# Patient Record
Sex: MALE | Race: BLACK OR AFRICAN AMERICAN | NOT HISPANIC OR LATINO | URBAN - METROPOLITAN AREA
[De-identification: names, ages, dates, MRNs, and addresses within clinical notes are randomized per-mention and may not be internally consistent; named-entity substitution may affect disease eponyms.]

---

## 2017-03-07 ENCOUNTER — EMERGENCY (EMERGENCY)
Facility: HOSPITAL | Age: 62
LOS: 1 days | Discharge: HOME | End: 2017-03-07
Attending: EMERGENCY MEDICINE | Admitting: EMERGENCY MEDICINE
Payer: MEDICAID

## 2017-03-07 VITALS
RESPIRATION RATE: 18 BRPM | DIASTOLIC BLOOD PRESSURE: 83 MMHG | TEMPERATURE: 98 F | SYSTOLIC BLOOD PRESSURE: 135 MMHG | HEART RATE: 71 BPM | OXYGEN SATURATION: 98 %

## 2017-03-07 DIAGNOSIS — M25.561 PAIN IN RIGHT KNEE: ICD-10-CM

## 2017-03-07 DIAGNOSIS — F10.129 ALCOHOL ABUSE WITH INTOXICATION, UNSPECIFIED: ICD-10-CM

## 2017-03-07 DIAGNOSIS — R41.82 ALTERED MENTAL STATUS, UNSPECIFIED: ICD-10-CM

## 2017-03-07 DIAGNOSIS — Z88.1 ALLERGY STATUS TO OTHER ANTIBIOTIC AGENTS STATUS: ICD-10-CM

## 2017-03-07 DIAGNOSIS — Z88.0 ALLERGY STATUS TO PENICILLIN: ICD-10-CM

## 2017-03-07 PROCEDURE — 99283 EMERGENCY DEPT VISIT LOW MDM: CPT

## 2017-03-07 NOTE — ED ADULT NURSE NOTE - CHIEF COMPLAINT QUOTE
Pt. presents to the ED picked up from [facility] for unsteady gait and right leg pain. Pt. reports having right leg surgery 3-4 weeks ago, external fixation device in place to leg. Pt. admits to drinking alcohol today and being homeless.

## 2017-03-07 NOTE — ED PROVIDER NOTE - MUSCULOSKELETAL, MLM
Spine appears normal, range of motion is not limited, no muscle or joint tenderness. External fixation to the right leg

## 2017-03-07 NOTE — ED PROVIDER NOTE - NS ED MD SCRIBE ATTENDING SCRIBE SECTIONS
REVIEW OF SYSTEMS/PHYSICAL EXAM/RESULTS/HIV/DISPOSITION/HISTORY OF PRESENT ILLNESS/VITAL SIGNS( Pullset)/PROGRESS NOTE/PAST MEDICAL/SURGICAL/SOCIAL HISTORY

## 2017-03-07 NOTE — ED PROVIDER NOTE - OBJECTIVE STATEMENT
61y.o male with a history of seizure and alcohol abuse presents to the ED for AMS. Pt brought into unsteady gait and right knee pain. Pt does not know where he received his right leg surgery. Has an external fixation in place.   Admits to drinking half a pint of vodka

## 2017-11-13 ENCOUNTER — EMERGENCY (EMERGENCY)
Facility: HOSPITAL | Age: 62
LOS: 1 days | Discharge: HOME | End: 2017-11-13
Attending: EMERGENCY MEDICINE
Payer: MEDICAID

## 2017-11-13 VITALS
TEMPERATURE: 98 F | OXYGEN SATURATION: 100 % | RESPIRATION RATE: 18 BRPM | DIASTOLIC BLOOD PRESSURE: 67 MMHG | SYSTOLIC BLOOD PRESSURE: 125 MMHG | HEART RATE: 50 BPM

## 2017-11-13 VITALS
OXYGEN SATURATION: 97 % | HEART RATE: 71 BPM | HEIGHT: 69 IN | DIASTOLIC BLOOD PRESSURE: 64 MMHG | WEIGHT: 160.06 LBS | TEMPERATURE: 98 F | SYSTOLIC BLOOD PRESSURE: 134 MMHG | RESPIRATION RATE: 16 BRPM

## 2017-11-13 DIAGNOSIS — R29.818 OTHER SYMPTOMS AND SIGNS INVOLVING THE NERVOUS SYSTEM: ICD-10-CM

## 2017-11-13 DIAGNOSIS — Z88.8 ALLERGY STATUS TO OTHER DRUGS, MEDICAMENTS AND BIOLOGICAL SUBSTANCES STATUS: ICD-10-CM

## 2017-11-13 DIAGNOSIS — Z88.0 ALLERGY STATUS TO PENICILLIN: ICD-10-CM

## 2017-11-13 LAB
ALBUMIN SERPL ELPH-MCNC: 3.4 G/DL — LOW (ref 3.5–5)
ALP SERPL-CCNC: 95 U/L — SIGNIFICANT CHANGE UP (ref 40–120)
ALT FLD-CCNC: 46 U/L DA — SIGNIFICANT CHANGE UP (ref 10–60)
ANION GAP SERPL CALC-SCNC: 6 MMOL/L — SIGNIFICANT CHANGE UP (ref 5–17)
AST SERPL-CCNC: 61 U/L — HIGH (ref 10–40)
BILIRUB SERPL-MCNC: 0.3 MG/DL — SIGNIFICANT CHANGE UP (ref 0.2–1.2)
BUN SERPL-MCNC: 20 MG/DL — HIGH (ref 7–18)
CALCIUM SERPL-MCNC: 8.9 MG/DL — SIGNIFICANT CHANGE UP (ref 8.4–10.5)
CHLORIDE SERPL-SCNC: 104 MMOL/L — SIGNIFICANT CHANGE UP (ref 96–108)
CO2 SERPL-SCNC: 30 MMOL/L — SIGNIFICANT CHANGE UP (ref 22–31)
CREAT SERPL-MCNC: 0.9 MG/DL — SIGNIFICANT CHANGE UP (ref 0.5–1.3)
GLUCOSE SERPL-MCNC: 92 MG/DL — SIGNIFICANT CHANGE UP (ref 70–99)
HCT VFR BLD CALC: 38.8 % — LOW (ref 39–50)
HGB BLD-MCNC: 12.6 G/DL — LOW (ref 13–17)
MCHC RBC-ENTMCNC: 32 PG — SIGNIFICANT CHANGE UP (ref 27–34)
MCHC RBC-ENTMCNC: 32.4 GM/DL — SIGNIFICANT CHANGE UP (ref 32–36)
MCV RBC AUTO: 98.7 FL — SIGNIFICANT CHANGE UP (ref 80–100)
PLATELET # BLD AUTO: 219 K/UL — SIGNIFICANT CHANGE UP (ref 150–400)
POTASSIUM SERPL-MCNC: 4.1 MMOL/L — SIGNIFICANT CHANGE UP (ref 3.5–5.3)
POTASSIUM SERPL-SCNC: 4.1 MMOL/L — SIGNIFICANT CHANGE UP (ref 3.5–5.3)
PROT SERPL-MCNC: 7.7 G/DL — SIGNIFICANT CHANGE UP (ref 6–8.3)
RBC # BLD: 3.93 M/UL — LOW (ref 4.2–5.8)
RBC # FLD: 13.2 % — SIGNIFICANT CHANGE UP (ref 10.3–14.5)
SODIUM SERPL-SCNC: 140 MMOL/L — SIGNIFICANT CHANGE UP (ref 135–145)
WBC # BLD: 5.1 K/UL — SIGNIFICANT CHANGE UP (ref 3.8–10.5)
WBC # FLD AUTO: 5.1 K/UL — SIGNIFICANT CHANGE UP (ref 3.8–10.5)

## 2017-11-13 PROCEDURE — 82962 GLUCOSE BLOOD TEST: CPT

## 2017-11-13 PROCEDURE — 70450 CT HEAD/BRAIN W/O DYE: CPT

## 2017-11-13 PROCEDURE — 99284 EMERGENCY DEPT VISIT MOD MDM: CPT | Mod: 25

## 2017-11-13 PROCEDURE — 80053 COMPREHEN METABOLIC PANEL: CPT

## 2017-11-13 PROCEDURE — 85027 COMPLETE CBC AUTOMATED: CPT

## 2017-11-13 PROCEDURE — 70450 CT HEAD/BRAIN W/O DYE: CPT | Mod: 26

## 2017-11-13 PROCEDURE — 96374 THER/PROPH/DIAG INJ IV PUSH: CPT

## 2017-11-13 PROCEDURE — 99284 EMERGENCY DEPT VISIT MOD MDM: CPT

## 2017-11-13 RX ORDER — LEVETIRACETAM 250 MG/1
1000 TABLET, FILM COATED ORAL EVERY 12 HOURS
Qty: 0 | Refills: 0 | Status: DISCONTINUED | OUTPATIENT
Start: 2017-11-13 | End: 2017-11-17

## 2017-11-13 RX ADMIN — Medication 50 MILLIGRAM(S): at 13:25

## 2017-11-13 RX ADMIN — LEVETIRACETAM 400 MILLIGRAM(S): 250 TABLET, FILM COATED ORAL at 13:24

## 2017-11-13 NOTE — ED PROVIDER NOTE - ENMT, MLM
Airway patent, Nasal mucosa clear. Mouth with normal mucosa. Throat has no vesicles, no oropharyngeal exudates and uvula is midline. No tongue fasciculations.

## 2017-11-13 NOTE — ED ADULT NURSE NOTE - CAS EDN DISCHARGE ASSESSMENT
Alert and oriented to person, place and time/Symptoms improved/No adverse reaction to first time med in ED/Awake

## 2017-11-13 NOTE — ED PROVIDER NOTE - OBJECTIVE STATEMENT
63 y/o M pt with PMHx of Seizure Disorder and Drug Abuse and no significant PSHx presents to ED c/o aura about possible seizure today. As per pt, pt "feels like he is going to have a seizure". Pt denies seizure, or any other complaints. Allergies: Penicillin (anaphylaxis), Dilantin (unknown).

## 2017-11-13 NOTE — ED PROVIDER NOTE - CONDUCTED A DETAILED DISCUSSION WITH PATIENT AND/OR GUARDIAN REGARDING, MDM
need for outpatient follow-up/lab results lab results/need for outpatient follow-up/radiology results/return to ED if symptoms worsen, persist or questions arise

## 2017-11-13 NOTE — ED ADULT NURSE NOTE - CHPI ED SYMPTOMS NEG
no numbness/no change in level of consciousness/no fever/no weakness/no dizziness/no vomiting/no nausea/no blurred vision/no loss of consciousness/no confusion

## 2017-11-13 NOTE — ED PROVIDER NOTE - MEDICAL DECISION MAKING DETAILS
pt came in for seizure aura, has not been taking his prescribed keppra  demanding to have klonopin and percocet for seizure. when discharged states he lost his bottle of klonopin in his ED bed. Bottle was empty in the nearby trash.   refusing to go home unless has prescription for klonopin. has pmd. aox3, ambulating, well appearing.   Discussed anticipatory guidance and return precautions. Discussed results and gave copy to pt.  Dc with outpt follow up.

## 2017-11-13 NOTE — ED PROVIDER NOTE - MUSCULOSKELETAL, MLM
Spine appears normal, range of motion is not limited, no muscle or joint tenderness. Essential hand tremors b/l.

## 2018-01-01 ENCOUNTER — EMERGENCY (EMERGENCY)
Facility: HOSPITAL | Age: 63
LOS: 0 days | Discharge: HOME | End: 2018-02-26
Attending: EMERGENCY MEDICINE
Payer: MEDICAID

## 2018-01-01 ENCOUNTER — EMERGENCY (EMERGENCY)
Facility: HOSPITAL | Age: 63
LOS: 1 days | Discharge: HOME | End: 2018-01-01
Admitting: EMERGENCY MEDICINE
Payer: MEDICAID

## 2018-01-01 VITALS
TEMPERATURE: 98 F | RESPIRATION RATE: 16 BRPM | DIASTOLIC BLOOD PRESSURE: 63 MMHG | OXYGEN SATURATION: 96 % | HEART RATE: 61 BPM | SYSTOLIC BLOOD PRESSURE: 102 MMHG

## 2018-01-01 VITALS
RESPIRATION RATE: 16 BRPM | OXYGEN SATURATION: 98 % | HEART RATE: 63 BPM | DIASTOLIC BLOOD PRESSURE: 68 MMHG | SYSTOLIC BLOOD PRESSURE: 114 MMHG | TEMPERATURE: 98 F

## 2018-01-01 VITALS
DIASTOLIC BLOOD PRESSURE: 60 MMHG | OXYGEN SATURATION: 98 % | SYSTOLIC BLOOD PRESSURE: 111 MMHG | TEMPERATURE: 98 F | HEART RATE: 66 BPM | RESPIRATION RATE: 17 BRPM

## 2018-01-01 VITALS
DIASTOLIC BLOOD PRESSURE: 39 MMHG | SYSTOLIC BLOOD PRESSURE: 108 MMHG | RESPIRATION RATE: 16 BRPM | HEART RATE: 42 BPM | OXYGEN SATURATION: 99 % | TEMPERATURE: 98 F

## 2018-01-01 DIAGNOSIS — G40.909 EPILEPSY, UNSPECIFIED, NOT INTRACTABLE, WITHOUT STATUS EPILEPTICUS: ICD-10-CM

## 2018-01-01 DIAGNOSIS — Z79.891 LONG TERM (CURRENT) USE OF OPIATE ANALGESIC: ICD-10-CM

## 2018-01-01 DIAGNOSIS — Z88.0 ALLERGY STATUS TO PENICILLIN: ICD-10-CM

## 2018-01-01 DIAGNOSIS — M54.9 DORSALGIA, UNSPECIFIED: ICD-10-CM

## 2018-01-01 DIAGNOSIS — R41.82 ALTERED MENTAL STATUS, UNSPECIFIED: ICD-10-CM

## 2018-01-01 DIAGNOSIS — Z79.1 LONG TERM (CURRENT) USE OF NON-STEROIDAL ANTI-INFLAMMATORIES (NSAID): ICD-10-CM

## 2018-01-01 DIAGNOSIS — F10.129 ALCOHOL ABUSE WITH INTOXICATION, UNSPECIFIED: ICD-10-CM

## 2018-01-01 DIAGNOSIS — F17.210 NICOTINE DEPENDENCE, CIGARETTES, UNCOMPLICATED: ICD-10-CM
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
## 2018-01-01 DIAGNOSIS — R10.9 UNSPECIFIED ABDOMINAL PAIN: ICD-10-CM
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
## 2018-01-01 DIAGNOSIS — Z88.8 ALLERGY STATUS TO OTHER DRUGS, MEDICAMENTS AND BIOLOGICAL SUBSTANCES: ICD-10-CM

## 2018-01-01 DIAGNOSIS — F11.29 OPIOID DEPENDENCE WITH UNSPECIFIED OPIOID-INDUCED DISORDER: ICD-10-CM

## 2018-01-01 DIAGNOSIS — Z79.899 OTHER LONG TERM (CURRENT) DRUG THERAPY: ICD-10-CM

## 2018-01-01 DIAGNOSIS — F10.10 ALCOHOL ABUSE, UNCOMPLICATED: ICD-10-CM

## 2018-01-01 DIAGNOSIS — S39.012A STRAIN OF MUSCLE, FASCIA AND TENDON OF LOWER BACK, INITIAL ENCOUNTER: ICD-10-CM

## 2018-01-01 LAB
ALBUMIN SERPL ELPH-MCNC: 3.4 G/DL — SIGNIFICANT CHANGE UP (ref 3.3–5)
ALP SERPL-CCNC: 81 U/L — SIGNIFICANT CHANGE UP (ref 40–120)
ALT FLD-CCNC: 56 U/L — SIGNIFICANT CHANGE UP (ref 12–78)
AMPHET UR-MCNC: NEGATIVE — SIGNIFICANT CHANGE UP
ANION GAP SERPL CALC-SCNC: 9 MMOL/L — SIGNIFICANT CHANGE UP (ref 5–17)
APAP SERPL-MCNC: < 2 UG/ML (ref 10–30)
APPEARANCE UR: CLEAR — SIGNIFICANT CHANGE UP
AST SERPL-CCNC: 110 U/L — HIGH (ref 15–37)
BARBITURATES UR SCN-MCNC: NEGATIVE — SIGNIFICANT CHANGE UP
BENZODIAZ UR-MCNC: POSITIVE — SIGNIFICANT CHANGE UP
BILIRUB SERPL-MCNC: 0.6 MG/DL — SIGNIFICANT CHANGE UP (ref 0.2–1.2)
BILIRUB UR-MCNC: NEGATIVE — SIGNIFICANT CHANGE UP
BUN SERPL-MCNC: 16 MG/DL — SIGNIFICANT CHANGE UP (ref 7–23)
CALCIUM SERPL-MCNC: 8.5 MG/DL — SIGNIFICANT CHANGE UP (ref 8.5–10.1)
CHLORIDE SERPL-SCNC: 105 MMOL/L — SIGNIFICANT CHANGE UP (ref 96–108)
CO2 SERPL-SCNC: 24 MMOL/L — SIGNIFICANT CHANGE UP (ref 22–31)
COCAINE METAB.OTHER UR-MCNC: NEGATIVE — SIGNIFICANT CHANGE UP
COD CRY URNS QL: ABNORMAL
COLOR SPEC: YELLOW — SIGNIFICANT CHANGE UP
CREAT SERPL-MCNC: 0.97 MG/DL — SIGNIFICANT CHANGE UP (ref 0.5–1.3)
CULTURE RESULTS: NO GROWTH — SIGNIFICANT CHANGE UP
DIFF PNL FLD: NEGATIVE — SIGNIFICANT CHANGE UP
ETHANOL SERPL-MCNC: <10 MG/DL — SIGNIFICANT CHANGE UP (ref 0–10)
GLUCOSE BLDC GLUCOMTR-MCNC: 97 MG/DL — SIGNIFICANT CHANGE UP (ref 70–99)
GLUCOSE SERPL-MCNC: 91 MG/DL — SIGNIFICANT CHANGE UP (ref 70–99)
GLUCOSE UR QL: NEGATIVE MG/DL — SIGNIFICANT CHANGE UP
KETONES UR-MCNC: NEGATIVE — SIGNIFICANT CHANGE UP
LEUKOCYTE ESTERASE UR-ACNC: NEGATIVE — SIGNIFICANT CHANGE UP
METHADONE UR-MCNC: POSITIVE — SIGNIFICANT CHANGE UP
NITRITE UR-MCNC: NEGATIVE — SIGNIFICANT CHANGE UP
OPIATES UR-MCNC: NEGATIVE — SIGNIFICANT CHANGE UP
PCP SPEC-MCNC: SIGNIFICANT CHANGE UP
PCP UR-MCNC: NEGATIVE — SIGNIFICANT CHANGE UP
PH UR: 5 — SIGNIFICANT CHANGE UP (ref 5–8)
POTASSIUM SERPL-MCNC: 4.2 MMOL/L — SIGNIFICANT CHANGE UP (ref 3.5–5.3)
POTASSIUM SERPL-SCNC: 4.2 MMOL/L — SIGNIFICANT CHANGE UP (ref 3.5–5.3)
PROT SERPL-MCNC: 7.6 GM/DL — SIGNIFICANT CHANGE UP (ref 6–8.3)
PROT UR-MCNC: NEGATIVE MG/DL — SIGNIFICANT CHANGE UP
SALICYLATES SERPL-MCNC: <1.7 MG/DL — LOW (ref 2.8–20)
SODIUM SERPL-SCNC: 138 MMOL/L — SIGNIFICANT CHANGE UP (ref 135–145)
SP GR SPEC: 1.02 — SIGNIFICANT CHANGE UP (ref 1.01–1.02)
SPECIMEN SOURCE: SIGNIFICANT CHANGE UP
THC UR QL: NEGATIVE — SIGNIFICANT CHANGE UP
UROBILINOGEN FLD QL: 8 MG/DL
WBC UR QL: SIGNIFICANT CHANGE UP

## 2018-01-01 PROCEDURE — 70450 CT HEAD/BRAIN W/O DYE: CPT | Mod: 26

## 2018-01-01 PROCEDURE — 99285 EMERGENCY DEPT VISIT HI MDM: CPT

## 2018-01-01 PROCEDURE — 74176 CT ABD & PELVIS W/O CONTRAST: CPT | Mod: 26

## 2018-01-01 PROCEDURE — 71045 X-RAY EXAM CHEST 1 VIEW: CPT | Mod: 26

## 2018-01-01 PROCEDURE — 99284 EMERGENCY DEPT VISIT MOD MDM: CPT

## 2018-01-01 RX ORDER — MIDAZOLAM HYDROCHLORIDE 1 MG/ML
2 INJECTION, SOLUTION INTRAMUSCULAR; INTRAVENOUS ONCE
Qty: 0 | Refills: 0 | Status: DISCONTINUED | OUTPATIENT
Start: 2018-01-01 | End: 2018-01-01

## 2018-01-01 RX ORDER — TETANUS TOXOID, REDUCED DIPHTHERIA TOXOID AND ACELLULAR PERTUSSIS VACCINE, ADSORBED 5; 2.5; 8; 8; 2.5 [IU]/.5ML; [IU]/.5ML; UG/.5ML; UG/.5ML; UG/.5ML
0.5 SUSPENSION INTRAMUSCULAR ONCE
Qty: 0 | Refills: 0 | Status: DISCONTINUED | OUTPATIENT
Start: 2018-01-01 | End: 2018-01-01

## 2018-01-01 RX ORDER — KETOROLAC TROMETHAMINE 30 MG/ML
60 SYRINGE (ML) INJECTION ONCE
Qty: 0 | Refills: 0 | Status: DISCONTINUED | OUTPATIENT
Start: 2018-01-01 | End: 2018-01-01

## 2018-01-01 RX ADMIN — Medication 60 MILLIGRAM(S): at 17:59

## 2018-01-26 ENCOUNTER — EMERGENCY (EMERGENCY)
Facility: HOSPITAL | Age: 63
LOS: 1 days | Discharge: HOME | End: 2018-01-26
Attending: EMERGENCY MEDICINE | Admitting: EMERGENCY MEDICINE
Payer: COMMERCIAL

## 2018-01-26 ENCOUNTER — EMERGENCY (EMERGENCY)
Facility: HOSPITAL | Age: 63
LOS: 1 days | Discharge: HOME | End: 2018-01-26
Attending: EMERGENCY MEDICINE | Admitting: EMERGENCY MEDICINE
Payer: MEDICAID

## 2018-01-26 VITALS
HEART RATE: 53 BPM | DIASTOLIC BLOOD PRESSURE: 70 MMHG | RESPIRATION RATE: 16 BRPM | SYSTOLIC BLOOD PRESSURE: 105 MMHG | OXYGEN SATURATION: 99 %

## 2018-01-26 VITALS
SYSTOLIC BLOOD PRESSURE: 105 MMHG | HEART RATE: 59 BPM | RESPIRATION RATE: 20 BRPM | TEMPERATURE: 98 F | OXYGEN SATURATION: 97 % | DIASTOLIC BLOOD PRESSURE: 79 MMHG

## 2018-01-26 VITALS
HEART RATE: 60 BPM | OXYGEN SATURATION: 98 % | RESPIRATION RATE: 16 BRPM | SYSTOLIC BLOOD PRESSURE: 103 MMHG | TEMPERATURE: 97 F | DIASTOLIC BLOOD PRESSURE: 68 MMHG

## 2018-01-26 DIAGNOSIS — Z79.899 OTHER LONG TERM (CURRENT) DRUG THERAPY: ICD-10-CM

## 2018-01-26 DIAGNOSIS — Z88.0 ALLERGY STATUS TO PENICILLIN: ICD-10-CM
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
## 2018-01-26 DIAGNOSIS — M25.561 PAIN IN RIGHT KNEE: ICD-10-CM

## 2018-01-26 DIAGNOSIS — Z79.891 LONG TERM (CURRENT) USE OF OPIATE ANALGESIC: ICD-10-CM

## 2018-01-26 DIAGNOSIS — M25.551 PAIN IN RIGHT HIP: ICD-10-CM

## 2018-01-26 DIAGNOSIS — Z79.1 LONG TERM (CURRENT) USE OF NON-STEROIDAL ANTI-INFLAMMATORIES (NSAID): ICD-10-CM

## 2018-01-26 DIAGNOSIS — G40.909 EPILEPSY, UNSPECIFIED, NOT INTRACTABLE, WITHOUT STATUS EPILEPTICUS: ICD-10-CM

## 2018-01-26 DIAGNOSIS — Z88.8 ALLERGY STATUS TO OTHER DRUGS, MEDICAMENTS AND BIOLOGICAL SUBSTANCES STATUS: ICD-10-CM
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
## 2018-01-26 LAB
ALBUMIN SERPL ELPH-MCNC: 3.2 G/DL — LOW (ref 3.4–5)
ALP SERPL-CCNC: 67 U/L — SIGNIFICANT CHANGE UP (ref 40–120)
ALT FLD-CCNC: 46 U/L — HIGH (ref 12–42)
ANION GAP SERPL CALC-SCNC: 8 MMOL/L — LOW (ref 9–16)
AST SERPL-CCNC: 55 U/L — HIGH (ref 15–37)
BILIRUB SERPL-MCNC: 0.3 MG/DL — SIGNIFICANT CHANGE UP (ref 0.2–1.2)
BUN SERPL-MCNC: 20 MG/DL — SIGNIFICANT CHANGE UP (ref 7–23)
CALCIUM SERPL-MCNC: 9 MG/DL — SIGNIFICANT CHANGE UP (ref 8.5–10.5)
CHLORIDE SERPL-SCNC: 104 MMOL/L — SIGNIFICANT CHANGE UP (ref 96–108)
CO2 SERPL-SCNC: 27 MMOL/L — SIGNIFICANT CHANGE UP (ref 22–31)
CREAT SERPL-MCNC: 1.33 MG/DL — HIGH (ref 0.5–1.3)
GLUCOSE SERPL-MCNC: 98 MG/DL — SIGNIFICANT CHANGE UP (ref 70–99)
HCT VFR BLD CALC: 34.8 % — LOW (ref 39–50)
HGB BLD-MCNC: 11.7 G/DL — LOW (ref 13–17)
MCHC RBC-ENTMCNC: 32.1 PG — SIGNIFICANT CHANGE UP (ref 27–34)
MCHC RBC-ENTMCNC: 33.6 G/DL — SIGNIFICANT CHANGE UP (ref 32–36)
MCV RBC AUTO: 95.3 FL — SIGNIFICANT CHANGE UP (ref 80–100)
PLATELET # BLD AUTO: 198 K/UL — SIGNIFICANT CHANGE UP (ref 150–400)
POTASSIUM SERPL-MCNC: 3.7 MMOL/L — SIGNIFICANT CHANGE UP (ref 3.5–5.3)
POTASSIUM SERPL-SCNC: 3.7 MMOL/L — SIGNIFICANT CHANGE UP (ref 3.5–5.3)
PROT SERPL-MCNC: 6.9 G/DL — SIGNIFICANT CHANGE UP (ref 6.4–8.2)
RBC # BLD: 3.65 M/UL — LOW (ref 4.2–5.8)
RBC # FLD: 13.2 % — SIGNIFICANT CHANGE UP (ref 10.3–16.9)
SODIUM SERPL-SCNC: 139 MMOL/L — SIGNIFICANT CHANGE UP (ref 132–145)
WBC # BLD: 3.8 K/UL — SIGNIFICANT CHANGE UP (ref 3.8–10.5)
WBC # FLD AUTO: 3.8 K/UL — SIGNIFICANT CHANGE UP (ref 3.8–10.5)

## 2018-01-26 PROCEDURE — 73562 X-RAY EXAM OF KNEE 3: CPT | Mod: 26,RT

## 2018-01-26 PROCEDURE — 73502 X-RAY EXAM HIP UNI 2-3 VIEWS: CPT | Mod: 26,RT

## 2018-01-26 PROCEDURE — 73562 X-RAY EXAM OF KNEE 3: CPT

## 2018-01-26 PROCEDURE — 82962 GLUCOSE BLOOD TEST: CPT

## 2018-01-26 PROCEDURE — 70450 CT HEAD/BRAIN W/O DYE: CPT | Mod: 26

## 2018-01-26 PROCEDURE — 70450 CT HEAD/BRAIN W/O DYE: CPT

## 2018-01-26 PROCEDURE — 99284 EMERGENCY DEPT VISIT MOD MDM: CPT

## 2018-01-26 PROCEDURE — 99284 EMERGENCY DEPT VISIT MOD MDM: CPT | Mod: 25

## 2018-01-26 PROCEDURE — 73502 X-RAY EXAM HIP UNI 2-3 VIEWS: CPT

## 2018-01-26 RX ORDER — ACETAMINOPHEN 500 MG
975 TABLET ORAL ONCE
Qty: 0 | Refills: 0 | Status: COMPLETED | OUTPATIENT
Start: 2018-01-26 | End: 2018-01-26

## 2018-01-26 RX ORDER — IBUPROFEN 200 MG
600 TABLET ORAL ONCE
Qty: 0 | Refills: 0 | Status: DISCONTINUED | OUTPATIENT
Start: 2018-01-26 | End: 2018-01-30

## 2018-01-26 RX ORDER — LEVETIRACETAM 250 MG/1
1000 TABLET, FILM COATED ORAL ONCE
Qty: 0 | Refills: 0 | Status: COMPLETED | OUTPATIENT
Start: 2018-01-26 | End: 2018-01-26

## 2018-01-26 RX ADMIN — LEVETIRACETAM 1000 MILLIGRAM(S): 250 TABLET, FILM COATED ORAL at 14:02

## 2018-01-26 NOTE — ED ADULT NURSE NOTE - OBJECTIVE STATEMENT
patient states that he has a history of seizures and has not taken his keppra in three days due to being out of his prescription. pt has mild trauma to the left side of the tongue, no bleeding. resting in bed, no signs of distress, 100% on room air, updated on plan of care, room free of hazards.

## 2018-01-26 NOTE — ED PROVIDER NOTE - MEDICAL DECISION MAKING DETAILS
fall, R hip and knee pain, though able to full ROM and able to bear weight w/ steady gait, will CT head, xray pelvis and knee, analgesia

## 2018-01-26 NOTE — ED ADULT NURSE REASSESSMENT NOTE - NS ED NURSE REASSESS COMMENT FT1
Pt insists [facility] staff have ID card.  Pt had no ID card upon arrival.  Pt was registered by registrars without ID.

## 2018-01-26 NOTE — ED ADULT TRIAGE NOTE - OTHER COMPLAINTS
biba c/o of right upper leg pain and knee pain [place], pt admits to drinking today     NO head injury

## 2018-01-26 NOTE — ED PROVIDER NOTE - CARE PLAN
Principal Discharge DX:	Hip pain, acute, right  Secondary Diagnosis:	Knee pain, right  Secondary Diagnosis:	Fall

## 2018-01-26 NOTE — ED PROVIDER NOTE - MEDICAL DECISION MAKING DETAILS
Szr due to med non-compliance.  Pt reports he has his medication at pharmacy, he just needs to pick it up.  Given Keppra here.  Labs checked.  Pt doing well.  No e/o head trauma and neurologically remains intact throughout stay.  Steady gait and safe for d/c.  No other complaints on d/c.

## 2018-01-26 NOTE — ED ADULT TRIAGE NOTE - CHIEF COMPLAINT QUOTE
[place] on the ground; as per EMS witnesses said that patient had multiple seizures; patient with hx of seizures and also weaning of methadone

## 2018-01-26 NOTE — ED PROVIDER NOTE - DIAGNOSTIC INTERPRETATION
ER Physician: Jeff Sims  HIP XRAY INTERPRETATION:  no acute fracture; no soft tissue swelling noted; normal bony alignment.   KNEE XRAY INTERPRETATION:  no acute fracture; no soft tissue swelling noted; normal bony alignment.

## 2018-01-26 NOTE — ED PROVIDER NOTE - PHYSICAL EXAMINATION
CON: ao x 3, HENMT: clear oropharynx, soft neck, HEAD: atraumatic, SKIN: no rash, MSK: no deformities, full ROM of b/l hip and R knee, able to bear weight, ambulating steadily w/o assistance, NEURO: no gross motor or sensory deficit

## 2018-01-26 NOTE — ED ADULT NURSE NOTE - CHPI ED SYMPTOMS NEG
no loss of consciousness/no confusion/no deformity/no weakness/no tingling/no bleeding/no fever/no numbness/no vomiting

## 2018-01-26 NOTE — ED PROVIDER NOTE - PHYSICAL EXAMINATION
VITAL SIGNS: I have reviewed nursing notes and confirm.  CONSTITUTIONAL: Lethargic but alert and oriented x 3. Able to give full history.  SKIN: Skin is warm and dry, no acute rash.  HEAD: Normocephalic; atraumatic.  EYES: PERRL, EOM intact; conjunctiva and sclera clear.  ENT: No nasal discharge; airway clear. Small tongue bite to right lateral tongue.  NECK: Supple; non tender.  CARD: S1, S2 normal; no murmurs, gallops, or rubs. Regular rate and rhythm.  RESP: No wheezes, rales or rhonchi.  ABD: Normal bowel sounds; soft; non-distended; non-tender; no hepatosplenomegaly.  EXT: Normal ROM. No clubbing, cyanosis or edema.  NEURO: Alert, oriented. Grossly unremarkable. CNs intact. Normal Romberg. Normal finger to nose. No pronator drift. Normal rapid alternating movements/heel to shin. Sensation intact to all extremities. 5/5 strength to all extremities.   PSYCH: Cooperative, appropriate. VITAL SIGNS: I have reviewed nursing notes and confirm.  CONSTITUTIONAL: Lethargic but alert and oriented x 3. Able to give full history.  SKIN: Skin is warm and dry, no acute rash.  HEAD: Normocephalic; atraumatic.  EYES: PERRL, EOM intact; conjunctiva and sclera clear.  ENT: No nasal discharge; airway clear. Small tongue bite to right lateral tongue - no acute bleeding.  NECK: Supple; non tender.  CARD: S1, S2 normal; no murmurs, gallops, or rubs. Regular rate and rhythm.  RESP: No wheezes, rales or rhonchi.  ABD: Normal bowel sounds; soft; non-distended; non-tender; no hepatosplenomegaly.  EXT: Normal ROM. No clubbing, cyanosis or edema.  NEURO: Alert, oriented. Grossly unremarkable. CNs intact. Normal Romberg. Normal finger to nose. No pronator drift. Normal rapid alternating movements/heel to shin. Sensation intact to all extremities. 5/5 strength to all extremities.   PSYCH: Cooperative, appropriate.
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
## 2018-01-26 NOTE — ED PROVIDER NOTE - OBJECTIVE STATEMENT
62y M with PMHx of seizure d/o (on Keppra 750m 2x/day, ran out and last took 3 days ago; on clonopin 1mg 3x/day, ran out), currently weaning off methadone, BIB EMS s/p witnessed seizure. [place] per EMS[place] and had a seizure. During seizure, bit the right side of his tongue and defecated on himself. Pt notes that he usually has an aura prior to his seizures, which he describes as a lightheaded feeling. Pt consumed 120mg of methadone this morning. 62y M with PMHx of AA, drug abuse, and seizure d/o (on Keppra 750m 2x/day, ran out and last took 3 days ago; on clonopin 1mg 3x/day, ran out), currently weaning off methadone, BIB EMS s/p witnessed seizure. [place] per [place] and had a seizure.  Denies significant head injury.  During seizure, bit the right side of his tongue and defecated on himself (minimal amt). Pt notes that he usually has an aura prior to his seizures, which he describes as a lightheaded feeling. Pt consumed 120mg of methadone this morning.

## 2018-02-12 ENCOUNTER — EMERGENCY (EMERGENCY)
Facility: HOSPITAL | Age: 63
LOS: 1 days | Discharge: HOME | End: 2018-02-12
Attending: EMERGENCY MEDICINE | Admitting: EMERGENCY MEDICINE
Payer: MEDICAID

## 2018-02-12 VITALS
OXYGEN SATURATION: 98 % | RESPIRATION RATE: 19 BRPM | SYSTOLIC BLOOD PRESSURE: 166 MMHG | HEART RATE: 74 BPM | TEMPERATURE: 98 F | DIASTOLIC BLOOD PRESSURE: 83 MMHG

## 2018-02-12 DIAGNOSIS — F17.210 NICOTINE DEPENDENCE, CIGARETTES, UNCOMPLICATED: ICD-10-CM

## 2018-02-12 DIAGNOSIS — Z79.891 LONG TERM (CURRENT) USE OF OPIATE ANALGESIC: ICD-10-CM

## 2018-02-12 DIAGNOSIS — R56.9 UNSPECIFIED CONVULSIONS: ICD-10-CM

## 2018-02-12 DIAGNOSIS — G40.909 EPILEPSY, UNSPECIFIED, NOT INTRACTABLE, WITHOUT STATUS EPILEPTICUS: ICD-10-CM

## 2018-02-12 DIAGNOSIS — Z88.8 ALLERGY STATUS TO OTHER DRUGS, MEDICAMENTS AND BIOLOGICAL SUBSTANCES STATUS: ICD-10-CM

## 2018-02-12 DIAGNOSIS — Z79.899 OTHER LONG TERM (CURRENT) DRUG THERAPY: ICD-10-CM

## 2018-02-12 DIAGNOSIS — Z88.0 ALLERGY STATUS TO PENICILLIN: ICD-10-CM

## 2018-02-12 DIAGNOSIS — Z79.1 LONG TERM (CURRENT) USE OF NON-STEROIDAL ANTI-INFLAMMATORIES (NSAID): ICD-10-CM

## 2018-02-12 PROCEDURE — 99284 EMERGENCY DEPT VISIT MOD MDM: CPT

## 2018-02-12 RX ORDER — LEVETIRACETAM 250 MG/1
750 TABLET, FILM COATED ORAL ONCE
Qty: 0 | Refills: 0 | Status: COMPLETED | OUTPATIENT
Start: 2018-02-12 | End: 2018-02-12

## 2018-02-12 RX ADMIN — LEVETIRACETAM 750 MILLIGRAM(S): 250 TABLET, FILM COATED ORAL at 21:03

## 2018-02-12 NOTE — ED ADULT NURSE NOTE - CHPI ED SYMPTOMS NEG
no fever/no vomiting/no blurred vision/no dizziness/no loss of consciousness/no weakness/no confusion/no nausea/no numbness/no change in level of consciousness

## 2018-02-12 NOTE — ED ADULT NURSE NOTE - CHIEF COMPLAINT QUOTE
Pt BIBA for c/o seizures 2/2 medication noncompliance. Pt was in [employer] when he activated EMS. Pt was not post-ictal on scene, no oral trauma or urinary incontinence. Pt is A + O x 3 in triage.

## 2018-02-12 NOTE — ED PROVIDER NOTE - PHYSICAL EXAMINATION
VITAL SIGNS: I have reviewed nursing notes and confirm.  CONSTITUTIONAL: Disheveled.  Slurred speech but fully alert and oriented, smells of alcohol.    SKIN: Skin is warm and dry, no acute rash.  No hematomas, abrasions, lacerations, or ecchymoses.   HEAD: Normocephalic; atraumatic.  EYES: PERRL, EOM intact; conjunctiva and sclera clear.  ENT: No nasal discharge; airway clear.  No tongue fasciculations or tongue lacs.   NECK: Supple; non tender.  CARD: S1, S2 normal; no murmurs, gallops, or rubs. Regular rate and rhythm.  RESP: No wheezes, rales or rhonchi.  ABD: Normal bowel sounds; soft; non-distended; non-tender; no hepatosplenomegaly.  EXT: Normal ROM. No clubbing, cyanosis or edema.  No tremors when UEs extended.   NEURO: Alert, oriented. Motor 5/5 throughout, CN 2-12 intact, SILT throughout.

## 2018-02-12 NOTE — ED PROVIDER NOTE - OBJECTIVE STATEMENT
Pt is a 61yo M with a h/o szr d/o and AA (drinking this evening).  Pt reports he has been intermittently taking his szr medications.  He reports taking keppra 750mg BID and Klonopin 2mg q day.  Pt reports getting his meds from EDs and complaining that no ER doctor will prescribe him his Klonopin.  No regular neurologist (will give referral).  EMS called to "[employer] 2/2 szr.  Pt not post-ictal on arrival as per EMS.  No incontinence.  No tongue bites.  Pt currently without complaint.  Denies any recent illness or any current sxs.

## 2018-02-12 NOTE — ED PROVIDER NOTE - MEDICAL DECISION MAKING DETAILS
Will give his Keppra.  Will hold Klonopin given EtOH abuse.  Strict instructions to f/u with a neurologist and to stop using EDs for Rx refills.  Szr due to medicine non-compliance.  PE is otherwise benign for any major acute illness so will not perform blood work.  Pt stable for discharge.

## 2018-02-12 NOTE — ED ADULT TRIAGE NOTE - CHIEF COMPLAINT QUOTE
Pt BIBA for c/o seizures 2/2 medication noncompliance. Pt was in [employer] when he activated EMS. Pt was not post-ictal on scene, no oral trauma or urinary incontinence. Pt is A + O x 3 in triage.

## 2018-02-26 NOTE — ED ADULT TRIAGE NOTE - CHIEF COMPLAINT QUOTE
brought In by ambulance for back pain  patient says its my kidney patient on methadone and appears to be falling asleep easily  patient lethargic in triage falling asleep pupils pinpoint

## 2018-02-26 NOTE — ED PROVIDER NOTE - OBJECTIVE STATEMENT
62 years old male here c/o constant left mid black pain left flank pain 7/10 since yesterday. Pt also admits to 60 mg methadone this morning. Pt however denies h 62 years old male here c/o constant left mid black pain left flank pain 7/10 since yesterday. Pt also admits to 60 mg methadone daily last dose was  this morning. Pt however denies recent trauma, headache, dizziness, blurred visions, light sensitivities, focal/distal weakness or numbness, neck pain, cough, sob, chest pain, nausea, vomiting, abd pain, dysuria, hematuria or irregular bowel movements. Pt also denies any harmful thoughts to herself or others, or auditory/visual hallucinations.

## 2018-02-26 NOTE — ED PROVIDER NOTE - PROGRESS NOTE DETAILS
Pt has been alert and oriented x 3 smiling, ambulating with normal gaits, standing by the nursing stations appears very comfortable Pt is ambulating with normal gaits without assists in the ed no focal neurological deficits on exam Pt refused blood tests.

## 2018-02-26 NOTE — ED ADULT NURSE REASSESSMENT NOTE - NS ED NURSE REASSESS COMMENT FT1
ao x3 , observed walking around the ed , refused blood work , stated he will like to do it himself . dr. de león notified , blood work  for cancellation as per dr. de león

## 2018-02-26 NOTE — ED PROVIDER NOTE - CONSTITUTIONAL, MLM
normal... Well appearing, well nourished, awake, alert, oriented to person, place, time/situation and in no apparent distress. Speaking in clear full sentences ambulating with normal gaits then standing by the chair no nasal flaring no shoulders retractions Well appearing, well nourished, awake, alert, oriented to person, place, time/situation and in no apparent distress. Speaking in clear full sentences ambulating with normal gaits then standing by the chair no nasal flaring no shoulders retractions, not holding his back, appears comfortable in a bright light room

## 2018-03-05 NOTE — ED ADULT NURSE NOTE - PAIN RATING/NUMBER SCALE (0-10): ACTIVITY
BHUPINDER - Equipment Use Daily Summary:                  . Set Mode:AUTO CPAP WITH C-FLEX                . Usage in hours: 9.5                . 90% Pressure (EPAP) level: 6.0                . 90% Insp. Pressure (IPAP): [place] AHI: 3.8                . 5

## 2018-05-11 NOTE — ED PROVIDER NOTE - OBJECTIVE STATEMENT
64 y/o M with PMH of ETOH and drug abuse presents to ED via EMS.  Pt admits to drug and alcohol use today.  Pt has hematoma to L temple.  He states "I fell hard."  He denies any other complaints.

## 2018-05-11 NOTE — ED PROVIDER NOTE - MEDICAL DECISION MAKING DETAILS
62 y/o M presents to ED with ETOH and drug tox.  Pt unkempt, undomiciled.  VSS.  Pt initially uncooperative with CT head but later amenable to test.

## 2018-05-11 NOTE — ED PROVIDER NOTE - CONSTITUTIONAL MENTATION
arouses to tactile stimuli of soft touch, answers questions, slurred speech, oriented to person only

## 2018-12-25 NOTE — ED PROVIDER NOTE - CROS ED CARDIOVAS ALL NEG
Subjective   History of Present Illness  54-year-old female history of bipolar disorder, COPD, tobacco abuse presenting with 1 week of worsening shortness of breath, wheezing, chest tightness.  States she has a cough productive of clear mucous.  Denies fevers, chills, chest pain, leg swelling, or other symptoms.  She states she has never had a COPD flare of this bad previously.    Review of Systems   Respiratory: Positive for cough, chest tightness and shortness of breath.    All other systems reviewed and are negative.      Past Medical History:   Diagnosis Date   • COPD (chronic obstructive pulmonary disease) (CMS/[facility])        Allergies   Allergen Reactions   • Aspirin Nausea Only   • Morphine Hives       History reviewed. No pertinent surgical history.    History reviewed. No pertinent family history.    Social History     Socioeconomic History   • Marital status: Single     Spouse name: Not on file   • Number of children: Not on file   • Years of education: Not on file   • Highest education level: Not on file   Tobacco Use   • Smoking status: Current Every Day Smoker     Packs/day: 0.50     Types: Cigarettes   • Smokeless tobacco: Never Used   Substance and Sexual Activity   • Alcohol use: Yes   • Drug use: No   • Sexual activity: Defer           Objective   Physical Exam   Constitutional: She is oriented to person, place, and time. She appears well-developed and well-nourished.  Non-toxic appearance. She does not appear ill. No distress.   HENT:   Head: Normocephalic.   Mouth/Throat: Oropharynx is clear and moist. No oropharyngeal exudate.   Eyes: Conjunctivae are normal. No scleral icterus.   Neck: Neck supple. No tracheal deviation present.   Cardiovascular: Normal rate, regular rhythm, normal heart sounds and intact distal pulses. Exam reveals no gallop and no friction rub.   No murmur heard.  Pulmonary/Chest: Effort normal. No stridor. No respiratory distress. She has wheezes (Diffusely). She has no rales.  She exhibits no tenderness.   Abdominal: Soft. She exhibits no distension and no mass. There is no tenderness. There is no rebound and no guarding. No hernia.   Musculoskeletal: She exhibits no edema or deformity.        Right lower leg: Normal.        Left lower leg: Normal.   Neurological: She is alert and oriented to person, place, and time.   Skin: Skin is warm and dry. She is not diaphoretic. No erythema. No pallor.   Psychiatric: She has a normal mood and affect. Her behavior is normal.   Nursing note and vitals reviewed.      Procedures           ED Course  ED Course as of Dec 25 0944   Tue Dec 25, 2018   0639 EKG: Interpreted by me. NSR w/ nl intervals, no lizbeth/std. Low voltage. Abnormal EKG    [AI]      ED Course User Index  [AI] Juan Ramon Jones MD                  MDM  Number of Diagnoses or Management Options  COPD exacerbation (CMS/HCC):   Hypoxia:   Diagnosis management comments: 0944 - despite having 3 nebulizer treatments of patient still require submental oxygen.  If you take her oxygen off she will drop down into the mid 80s.  Currently on 4 L via nasal cannula she is satting around 93%.  I did speak with Dr. Solorzano who graciously accepted the patient for hospitalization.      Greater than 30 minutes critical care time was spent by the attending physician excluding separately billable procedures.       Amount and/or Complexity of Data Reviewed  Clinical lab tests: reviewed  Tests in the radiology section of CPT®: reviewed  Decide to obtain previous medical records or to obtain history from someone other than the patient: yes  Obtain history from someone other than the patient: yes  Review and summarize past medical records: yes  Discuss the patient with other providers: yes  Independent visualization of images, tracings, or specimens: yes      54-year-old female here with wheezing and shortness of breath.  Afebrile, vital signs were all for tachycardia and hypoxia to 86% on room air on arrival.   She is diffusely wheezy consistent with COPD exacerbation.  However, given lack of any prior flares this bad, will send labs, chest x-ray, treat symptomatically and reassess.  Will discuss with the oncoming team.    Final diagnoses:   COPD exacerbation (CMS/[facility])            Tristian Ray MD  12/25/18 0954     negative...

## 2019-01-01 ENCOUNTER — INPATIENT (INPATIENT)
Facility: HOSPITAL | Age: 64
LOS: 0 days | DRG: 82 | End: 2019-02-18
Attending: SURGERY | Admitting: SURGERY
Payer: MEDICAID

## 2019-01-01 ENCOUNTER — EMERGENCY (EMERGENCY)
Facility: HOSPITAL | Age: 64
LOS: 1 days | Discharge: HOME | End: 2019-01-01
Attending: EMERGENCY MEDICINE | Admitting: EMERGENCY MEDICINE
Payer: MEDICAID

## 2019-01-01 VITALS
SYSTOLIC BLOOD PRESSURE: 116 MMHG | OXYGEN SATURATION: 95 % | HEART RATE: 80 BPM | DIASTOLIC BLOOD PRESSURE: 88 MMHG | WEIGHT: 158.29 LBS | RESPIRATION RATE: 20 BRPM

## 2019-01-01 VITALS
OXYGEN SATURATION: 99 % | HEART RATE: 75 BPM | SYSTOLIC BLOOD PRESSURE: 132 MMHG | TEMPERATURE: 98 F | RESPIRATION RATE: 18 BRPM | DIASTOLIC BLOOD PRESSURE: 94 MMHG

## 2019-01-01 VITALS
OXYGEN SATURATION: 100 % | HEART RATE: 72 BPM | DIASTOLIC BLOOD PRESSURE: 102 MMHG | RESPIRATION RATE: 20 BRPM | SYSTOLIC BLOOD PRESSURE: 203 MMHG

## 2019-01-01 VITALS — OXYGEN SATURATION: 69 %

## 2019-01-01 DIAGNOSIS — S06.5X9A TRAUMATIC SUBDURAL HEMORRHAGE WITH LOSS OF CONSCIOUSNESS OF UNSPECIFIED DURATION, INITIAL ENCOUNTER: ICD-10-CM

## 2019-01-01 DIAGNOSIS — S06.6X0A TRAUMATIC SUBARACHNOID HEMORRHAGE WITHOUT LOSS OF CONSCIOUSNESS, INITIAL ENCOUNTER: ICD-10-CM

## 2019-01-01 LAB
ALBUMIN SERPL ELPH-MCNC: 3.9 G/DL — SIGNIFICANT CHANGE UP (ref 3.3–5)
ALBUMIN SERPL ELPH-MCNC: 3.9 G/DL — SIGNIFICANT CHANGE UP (ref 3.3–5)
ALBUMIN SERPL ELPH-MCNC: 4.4 G/DL — SIGNIFICANT CHANGE UP (ref 3.3–5)
ALP SERPL-CCNC: 77 U/L — SIGNIFICANT CHANGE UP (ref 40–120)
ALP SERPL-CCNC: 86 U/L — SIGNIFICANT CHANGE UP (ref 40–120)
ALP SERPL-CCNC: 87 U/L — SIGNIFICANT CHANGE UP (ref 40–120)
ALT FLD-CCNC: 27 U/L — SIGNIFICANT CHANGE UP (ref 10–45)
ALT FLD-CCNC: 29 U/L — SIGNIFICANT CHANGE UP (ref 10–45)
ALT FLD-CCNC: 48 U/L — HIGH (ref 10–45)
AMPHET UR-MCNC: NEGATIVE — SIGNIFICANT CHANGE UP
ANION GAP SERPL CALC-SCNC: 12 MMOL/L — SIGNIFICANT CHANGE UP (ref 5–17)
ANION GAP SERPL CALC-SCNC: 13 MMOL/L — SIGNIFICANT CHANGE UP (ref 5–17)
ANION GAP SERPL CALC-SCNC: 14 MMOL/L — SIGNIFICANT CHANGE UP (ref 5–17)
ANION GAP SERPL CALC-SCNC: 14 MMOL/L — SIGNIFICANT CHANGE UP (ref 5–17)
ANION GAP SERPL CALC-SCNC: 15 MMOL/L — SIGNIFICANT CHANGE UP (ref 5–17)
ANION GAP SERPL CALC-SCNC: 15 MMOL/L — SIGNIFICANT CHANGE UP (ref 5–17)
APPEARANCE UR: CLEAR — SIGNIFICANT CHANGE UP
APTT BLD: 25.5 SEC — LOW (ref 27.5–36.3)
APTT BLD: 30.5 SEC — SIGNIFICANT CHANGE UP (ref 27.5–36.3)
APTT BLD: 31.1 SEC — SIGNIFICANT CHANGE UP (ref 27.5–36.3)
AST SERPL-CCNC: 27 U/L — SIGNIFICANT CHANGE UP (ref 10–40)
AST SERPL-CCNC: 34 U/L — SIGNIFICANT CHANGE UP (ref 10–40)
AST SERPL-CCNC: 71 U/L — HIGH (ref 10–40)
BARBITURATES UR SCN-MCNC: NEGATIVE — SIGNIFICANT CHANGE UP
BASOPHILS # BLD AUTO: 0 K/UL — SIGNIFICANT CHANGE UP (ref 0–0.2)
BASOPHILS # BLD AUTO: 0.1 K/UL — SIGNIFICANT CHANGE UP (ref 0–0.2)
BASOPHILS NFR BLD AUTO: 0.8 % — SIGNIFICANT CHANGE UP (ref 0–2)
BENZODIAZ UR-MCNC: NEGATIVE — SIGNIFICANT CHANGE UP
BILIRUB SERPL-MCNC: 0.4 MG/DL — SIGNIFICANT CHANGE UP (ref 0.2–1.2)
BILIRUB SERPL-MCNC: 0.5 MG/DL — SIGNIFICANT CHANGE UP (ref 0.2–1.2)
BILIRUB SERPL-MCNC: 0.6 MG/DL — SIGNIFICANT CHANGE UP (ref 0.2–1.2)
BILIRUB UR-MCNC: NEGATIVE — SIGNIFICANT CHANGE UP
BLD GP AB SCN SERPL QL: NEGATIVE — SIGNIFICANT CHANGE UP
BLD GP AB SCN SERPL QL: NEGATIVE — SIGNIFICANT CHANGE UP
BUN SERPL-MCNC: 16 MG/DL — SIGNIFICANT CHANGE UP (ref 7–23)
BUN SERPL-MCNC: 17 MG/DL — SIGNIFICANT CHANGE UP (ref 7–23)
BUN SERPL-MCNC: 17 MG/DL — SIGNIFICANT CHANGE UP (ref 7–23)
BUN SERPL-MCNC: 18 MG/DL — SIGNIFICANT CHANGE UP (ref 7–23)
BUN SERPL-MCNC: 21 MG/DL — SIGNIFICANT CHANGE UP (ref 7–23)
CALCIUM SERPL-MCNC: 8.5 MG/DL — SIGNIFICANT CHANGE UP (ref 8.4–10.5)
CALCIUM SERPL-MCNC: 8.6 MG/DL — SIGNIFICANT CHANGE UP (ref 8.4–10.5)
CALCIUM SERPL-MCNC: 8.8 MG/DL — SIGNIFICANT CHANGE UP (ref 8.4–10.5)
CALCIUM SERPL-MCNC: 8.9 MG/DL — SIGNIFICANT CHANGE UP (ref 8.4–10.5)
CALCIUM SERPL-MCNC: 9 MG/DL — SIGNIFICANT CHANGE UP (ref 8.4–10.5)
CALCIUM SERPL-MCNC: 9.1 MG/DL — SIGNIFICANT CHANGE UP (ref 8.4–10.5)
CALCIUM SERPL-MCNC: 9.6 MG/DL — SIGNIFICANT CHANGE UP (ref 8.4–10.5)
CHLORIDE SERPL-SCNC: 106 MMOL/L — SIGNIFICANT CHANGE UP (ref 96–108)
CHLORIDE SERPL-SCNC: 107 MMOL/L — SIGNIFICANT CHANGE UP (ref 96–108)
CHLORIDE SERPL-SCNC: 109 MMOL/L — HIGH (ref 96–108)
CHLORIDE SERPL-SCNC: 113 MMOL/L — HIGH (ref 96–108)
CHLORIDE SERPL-SCNC: 116 MMOL/L — HIGH (ref 96–108)
CHLORIDE SERPL-SCNC: 116 MMOL/L — HIGH (ref 96–108)
CHLORIDE SERPL-SCNC: 117 MMOL/L — HIGH (ref 96–108)
CHLORIDE SERPL-SCNC: 119 MMOL/L — HIGH (ref 96–108)
CHLORIDE SERPL-SCNC: 99 MMOL/L — SIGNIFICANT CHANGE UP (ref 96–108)
CO2 SERPL-SCNC: 17 MMOL/L — LOW (ref 22–31)
CO2 SERPL-SCNC: 21 MMOL/L — LOW (ref 22–31)
CO2 SERPL-SCNC: 22 MMOL/L — SIGNIFICANT CHANGE UP (ref 22–31)
CO2 SERPL-SCNC: 22 MMOL/L — SIGNIFICANT CHANGE UP (ref 22–31)
CO2 SERPL-SCNC: 23 MMOL/L — SIGNIFICANT CHANGE UP (ref 22–31)
CO2 SERPL-SCNC: 24 MMOL/L — SIGNIFICANT CHANGE UP (ref 22–31)
CO2 SERPL-SCNC: 25 MMOL/L — SIGNIFICANT CHANGE UP (ref 22–31)
COCAINE METAB.OTHER UR-MCNC: NEGATIVE — SIGNIFICANT CHANGE UP
COLOR SPEC: SIGNIFICANT CHANGE UP
CREAT ?TM UR-MCNC: 7 MG/DL — SIGNIFICANT CHANGE UP
CREAT ?TM UR-MCNC: 8 MG/DL — SIGNIFICANT CHANGE UP
CREAT SERPL-MCNC: 0.81 MG/DL — SIGNIFICANT CHANGE UP (ref 0.5–1.3)
CREAT SERPL-MCNC: 0.82 MG/DL — SIGNIFICANT CHANGE UP (ref 0.5–1.3)
CREAT SERPL-MCNC: 0.85 MG/DL — SIGNIFICANT CHANGE UP (ref 0.5–1.3)
CREAT SERPL-MCNC: 0.87 MG/DL — SIGNIFICANT CHANGE UP (ref 0.5–1.3)
CREAT SERPL-MCNC: 0.98 MG/DL — SIGNIFICANT CHANGE UP (ref 0.5–1.3)
CREAT SERPL-MCNC: 1 MG/DL — SIGNIFICANT CHANGE UP (ref 0.5–1.3)
CREAT SERPL-MCNC: 1 MG/DL — SIGNIFICANT CHANGE UP (ref 0.5–1.3)
CREAT SERPL-MCNC: 1.01 MG/DL — SIGNIFICANT CHANGE UP (ref 0.5–1.3)
CREAT SERPL-MCNC: 1.16 MG/DL — SIGNIFICANT CHANGE UP (ref 0.5–1.3)
DIFF PNL FLD: NEGATIVE — SIGNIFICANT CHANGE UP
EOSINOPHIL # BLD AUTO: 0 K/UL — SIGNIFICANT CHANGE UP (ref 0–0.5)
EOSINOPHIL # BLD AUTO: 0.1 K/UL — SIGNIFICANT CHANGE UP (ref 0–0.5)
EOSINOPHIL NFR BLD AUTO: 1 % — SIGNIFICANT CHANGE UP (ref 0–6)
EOSINOPHIL NFR BLD AUTO: 2.2 % — SIGNIFICANT CHANGE UP (ref 0–6)
ETHANOL SERPL-MCNC: SIGNIFICANT CHANGE UP MG/DL (ref 0–10)
ETHANOL SERPL-MCNC: SIGNIFICANT CHANGE UP MG/DL (ref 0–10)
GAS PNL BLDA: SIGNIFICANT CHANGE UP
GLUCOSE SERPL-MCNC: 132 MG/DL — HIGH (ref 70–99)
GLUCOSE SERPL-MCNC: 140 MG/DL — HIGH (ref 70–99)
GLUCOSE SERPL-MCNC: 141 MG/DL — HIGH (ref 70–99)
GLUCOSE SERPL-MCNC: 149 MG/DL — HIGH (ref 70–99)
GLUCOSE SERPL-MCNC: 154 MG/DL — HIGH (ref 70–99)
GLUCOSE SERPL-MCNC: 157 MG/DL — HIGH (ref 70–99)
GLUCOSE SERPL-MCNC: 161 MG/DL — HIGH (ref 70–99)
GLUCOSE SERPL-MCNC: 177 MG/DL — HIGH (ref 70–99)
GLUCOSE SERPL-MCNC: 87 MG/DL — SIGNIFICANT CHANGE UP (ref 70–99)
GLUCOSE UR QL: NEGATIVE — SIGNIFICANT CHANGE UP
HCT VFR BLD CALC: 38.6 % — LOW (ref 39–50)
HCT VFR BLD CALC: 41.9 % — SIGNIFICANT CHANGE UP (ref 39–50)
HCT VFR BLD CALC: 43.5 % — SIGNIFICANT CHANGE UP (ref 39–50)
HCV AB S/CO SERPL IA: 12.66 S/CO — SIGNIFICANT CHANGE UP
HCV AB SERPL-IMP: REACTIVE
HGB BLD-MCNC: 13.3 G/DL — SIGNIFICANT CHANGE UP (ref 13–17)
HGB BLD-MCNC: 14.4 G/DL — SIGNIFICANT CHANGE UP (ref 13–17)
HGB BLD-MCNC: 14.9 G/DL — SIGNIFICANT CHANGE UP (ref 13–17)
INR BLD: 0.97 RATIO — SIGNIFICANT CHANGE UP (ref 0.88–1.16)
INR BLD: 0.97 RATIO — SIGNIFICANT CHANGE UP (ref 0.88–1.16)
INR BLD: 1.27 RATIO — HIGH (ref 0.88–1.16)
KETONES UR-MCNC: NEGATIVE — SIGNIFICANT CHANGE UP
LEUKOCYTE ESTERASE UR-ACNC: NEGATIVE — SIGNIFICANT CHANGE UP
LYMPHOCYTES # BLD AUTO: 12 % — LOW (ref 13–44)
LYMPHOCYTES # BLD AUTO: 2 K/UL — SIGNIFICANT CHANGE UP (ref 1–3.3)
LYMPHOCYTES # BLD AUTO: 2.6 K/UL — SIGNIFICANT CHANGE UP (ref 1–3.3)
LYMPHOCYTES # BLD AUTO: 41.3 % — SIGNIFICANT CHANGE UP (ref 13–44)
MAGNESIUM SERPL-MCNC: 2.2 MG/DL — SIGNIFICANT CHANGE UP (ref 1.6–2.6)
MAGNESIUM SERPL-MCNC: 2.4 MG/DL — SIGNIFICANT CHANGE UP (ref 1.6–2.6)
MCHC RBC-ENTMCNC: 32.2 PG — SIGNIFICANT CHANGE UP (ref 27–34)
MCHC RBC-ENTMCNC: 32.3 PG — SIGNIFICANT CHANGE UP (ref 27–34)
MCHC RBC-ENTMCNC: 32.8 PG — SIGNIFICANT CHANGE UP (ref 27–34)
MCHC RBC-ENTMCNC: 34.1 GM/DL — SIGNIFICANT CHANGE UP (ref 32–36)
MCHC RBC-ENTMCNC: 34.2 GM/DL — SIGNIFICANT CHANGE UP (ref 32–36)
MCHC RBC-ENTMCNC: 34.4 GM/DL — SIGNIFICANT CHANGE UP (ref 32–36)
MCV RBC AUTO: 94 FL — SIGNIFICANT CHANGE UP (ref 80–100)
MCV RBC AUTO: 94.1 FL — SIGNIFICANT CHANGE UP (ref 80–100)
MCV RBC AUTO: 96 FL — SIGNIFICANT CHANGE UP (ref 80–100)
METHADONE UR-MCNC: POSITIVE
MONOCYTES # BLD AUTO: 0.8 K/UL — SIGNIFICANT CHANGE UP (ref 0–0.9)
MONOCYTES # BLD AUTO: 2 K/UL — HIGH (ref 0–0.9)
MONOCYTES NFR BLD AUTO: 11.8 % — SIGNIFICANT CHANGE UP (ref 2–14)
MONOCYTES NFR BLD AUTO: 8 % — SIGNIFICANT CHANGE UP (ref 2–14)
NEUTROPHILS # BLD AUTO: 15.5 K/UL — HIGH (ref 1.8–7.4)
NEUTROPHILS # BLD AUTO: 2.8 K/UL — SIGNIFICANT CHANGE UP (ref 1.8–7.4)
NEUTROPHILS NFR BLD AUTO: 43.9 % — SIGNIFICANT CHANGE UP (ref 43–77)
NEUTROPHILS NFR BLD AUTO: 79 % — HIGH (ref 43–77)
NITRITE UR-MCNC: NEGATIVE — SIGNIFICANT CHANGE UP
OPIATES UR-MCNC: NEGATIVE — SIGNIFICANT CHANGE UP
OSMOLALITY SERPL: 329 MOS/KG — HIGH (ref 275–300)
OSMOLALITY UR: 97 MOS/KG — LOW (ref 300–900)
OXYCODONE UR-MCNC: NEGATIVE — SIGNIFICANT CHANGE UP
PCP SPEC-MCNC: SIGNIFICANT CHANGE UP
PCP UR-MCNC: NEGATIVE — SIGNIFICANT CHANGE UP
PH UR: 6 — SIGNIFICANT CHANGE UP (ref 5–8)
PHOSPHATE SERPL-MCNC: 3.6 MG/DL — SIGNIFICANT CHANGE UP (ref 2.5–4.5)
PHOSPHATE SERPL-MCNC: 3.9 MG/DL — SIGNIFICANT CHANGE UP (ref 2.5–4.5)
PLATELET # BLD AUTO: 257 K/UL — SIGNIFICANT CHANGE UP (ref 150–400)
PLATELET # BLD AUTO: 287 K/UL — SIGNIFICANT CHANGE UP (ref 150–400)
PLATELET # BLD AUTO: 319 K/UL — SIGNIFICANT CHANGE UP (ref 150–400)
POTASSIUM SERPL-MCNC: 3.5 MMOL/L — SIGNIFICANT CHANGE UP (ref 3.5–5.3)
POTASSIUM SERPL-MCNC: 3.6 MMOL/L — SIGNIFICANT CHANGE UP (ref 3.5–5.3)
POTASSIUM SERPL-MCNC: 3.6 MMOL/L — SIGNIFICANT CHANGE UP (ref 3.5–5.3)
POTASSIUM SERPL-MCNC: 3.7 MMOL/L — SIGNIFICANT CHANGE UP (ref 3.5–5.3)
POTASSIUM SERPL-MCNC: 3.7 MMOL/L — SIGNIFICANT CHANGE UP (ref 3.5–5.3)
POTASSIUM SERPL-MCNC: 3.8 MMOL/L — SIGNIFICANT CHANGE UP (ref 3.5–5.3)
POTASSIUM SERPL-MCNC: 4.1 MMOL/L — SIGNIFICANT CHANGE UP (ref 3.5–5.3)
POTASSIUM SERPL-MCNC: 4.3 MMOL/L — SIGNIFICANT CHANGE UP (ref 3.5–5.3)
POTASSIUM SERPL-MCNC: 6.3 MMOL/L — CRITICAL HIGH (ref 3.5–5.3)
POTASSIUM SERPL-SCNC: 3.5 MMOL/L — SIGNIFICANT CHANGE UP (ref 3.5–5.3)
POTASSIUM SERPL-SCNC: 3.6 MMOL/L — SIGNIFICANT CHANGE UP (ref 3.5–5.3)
POTASSIUM SERPL-SCNC: 3.6 MMOL/L — SIGNIFICANT CHANGE UP (ref 3.5–5.3)
POTASSIUM SERPL-SCNC: 3.7 MMOL/L — SIGNIFICANT CHANGE UP (ref 3.5–5.3)
POTASSIUM SERPL-SCNC: 3.7 MMOL/L — SIGNIFICANT CHANGE UP (ref 3.5–5.3)
POTASSIUM SERPL-SCNC: 3.8 MMOL/L — SIGNIFICANT CHANGE UP (ref 3.5–5.3)
POTASSIUM SERPL-SCNC: 4.1 MMOL/L — SIGNIFICANT CHANGE UP (ref 3.5–5.3)
POTASSIUM SERPL-SCNC: 4.3 MMOL/L — SIGNIFICANT CHANGE UP (ref 3.5–5.3)
POTASSIUM SERPL-SCNC: 6.3 MMOL/L — CRITICAL HIGH (ref 3.5–5.3)
POTASSIUM UR-SCNC: 5 MMOL/L — SIGNIFICANT CHANGE UP
PROT SERPL-MCNC: 7.4 G/DL — SIGNIFICANT CHANGE UP (ref 6–8.3)
PROT SERPL-MCNC: 8 G/DL — SIGNIFICANT CHANGE UP (ref 6–8.3)
PROT SERPL-MCNC: 8 G/DL — SIGNIFICANT CHANGE UP (ref 6–8.3)
PROT UR-MCNC: NEGATIVE — SIGNIFICANT CHANGE UP
PROTHROM AB SERPL-ACNC: 11.1 SEC — SIGNIFICANT CHANGE UP (ref 10–12.9)
PROTHROM AB SERPL-ACNC: 11.2 SEC — SIGNIFICANT CHANGE UP (ref 10–12.9)
PROTHROM AB SERPL-ACNC: 14.6 SEC — HIGH (ref 10–12.9)
RBC # BLD: 4.1 M/UL — LOW (ref 4.2–5.8)
RBC # BLD: 4.46 M/UL — SIGNIFICANT CHANGE UP (ref 4.2–5.8)
RBC # BLD: 4.54 M/UL — SIGNIFICANT CHANGE UP (ref 4.2–5.8)
RBC # FLD: 12.3 % — SIGNIFICANT CHANGE UP (ref 10.3–14.5)
RBC # FLD: 12.4 % — SIGNIFICANT CHANGE UP (ref 10.3–14.5)
RBC # FLD: 12.5 % — SIGNIFICANT CHANGE UP (ref 10.3–14.5)
RH IG SCN BLD-IMP: POSITIVE — SIGNIFICANT CHANGE UP
SODIUM SERPL-SCNC: 138 MMOL/L — SIGNIFICANT CHANGE UP (ref 135–145)
SODIUM SERPL-SCNC: 138 MMOL/L — SIGNIFICANT CHANGE UP (ref 135–145)
SODIUM SERPL-SCNC: 143 MMOL/L — SIGNIFICANT CHANGE UP (ref 135–145)
SODIUM SERPL-SCNC: 144 MMOL/L — SIGNIFICANT CHANGE UP (ref 135–145)
SODIUM SERPL-SCNC: 147 MMOL/L — HIGH (ref 135–145)
SODIUM SERPL-SCNC: 150 MMOL/L — HIGH (ref 135–145)
SODIUM SERPL-SCNC: 152 MMOL/L — HIGH (ref 135–145)
SODIUM SERPL-SCNC: 153 MMOL/L — HIGH (ref 135–145)
SODIUM SERPL-SCNC: 155 MMOL/L — HIGH (ref 135–145)
SODIUM UR-SCNC: 23 MMOL/L — SIGNIFICANT CHANGE UP
SODIUM UR-SCNC: 24 MMOL/L — SIGNIFICANT CHANGE UP
SP GR SPEC: 1.01 — SIGNIFICANT CHANGE UP (ref 1.01–1.02)
THC UR QL: NEGATIVE — SIGNIFICANT CHANGE UP
TROPONIN T, HIGH SENSITIVITY RESULT: 104 NG/L — HIGH (ref 0–51)
TROPONIN T, HIGH SENSITIVITY RESULT: 111 NG/L — HIGH (ref 0–51)
TROPONIN T, HIGH SENSITIVITY RESULT: 21 NG/L — SIGNIFICANT CHANGE UP (ref 0–51)
UROBILINOGEN FLD QL: NEGATIVE — SIGNIFICANT CHANGE UP
WBC # BLD: 15.5 K/UL — HIGH (ref 3.8–10.5)
WBC # BLD: 19.6 K/UL — HIGH (ref 3.8–10.5)
WBC # BLD: 6.4 K/UL — SIGNIFICANT CHANGE UP (ref 3.8–10.5)
WBC # FLD AUTO: 15.5 K/UL — HIGH (ref 3.8–10.5)
WBC # FLD AUTO: 19.6 K/UL — HIGH (ref 3.8–10.5)
WBC # FLD AUTO: 6.4 K/UL — SIGNIFICANT CHANGE UP (ref 3.8–10.5)

## 2019-01-01 PROCEDURE — 85610 PROTHROMBIN TIME: CPT

## 2019-01-01 PROCEDURE — 83735 ASSAY OF MAGNESIUM: CPT

## 2019-01-01 PROCEDURE — 81003 URINALYSIS AUTO W/O SCOPE: CPT

## 2019-01-01 PROCEDURE — 93005 ELECTROCARDIOGRAM TRACING: CPT

## 2019-01-01 PROCEDURE — 80053 COMPREHEN METABOLIC PANEL: CPT

## 2019-01-01 PROCEDURE — 83935 ASSAY OF URINE OSMOLALITY: CPT

## 2019-01-01 PROCEDURE — 71045 X-RAY EXAM CHEST 1 VIEW: CPT | Mod: 26

## 2019-01-01 PROCEDURE — 85730 THROMBOPLASTIN TIME PARTIAL: CPT

## 2019-01-01 PROCEDURE — 99291 CRITICAL CARE FIRST HOUR: CPT | Mod: 25

## 2019-01-01 PROCEDURE — 84132 ASSAY OF SERUM POTASSIUM: CPT

## 2019-01-01 PROCEDURE — 80307 DRUG TEST PRSMV CHEM ANLYZR: CPT

## 2019-01-01 PROCEDURE — 70450 CT HEAD/BRAIN W/O DYE: CPT

## 2019-01-01 PROCEDURE — 96374 THER/PROPH/DIAG INJ IV PUSH: CPT

## 2019-01-01 PROCEDURE — 99284 EMERGENCY DEPT VISIT MOD MDM: CPT | Mod: 25

## 2019-01-01 PROCEDURE — 86803 HEPATITIS C AB TEST: CPT

## 2019-01-01 PROCEDURE — 83605 ASSAY OF LACTIC ACID: CPT

## 2019-01-01 PROCEDURE — 82947 ASSAY GLUCOSE BLOOD QUANT: CPT

## 2019-01-01 PROCEDURE — 93010 ELECTROCARDIOGRAM REPORT: CPT

## 2019-01-01 PROCEDURE — 70450 CT HEAD/BRAIN W/O DYE: CPT | Mod: 26

## 2019-01-01 PROCEDURE — 85027 COMPLETE CBC AUTOMATED: CPT

## 2019-01-01 PROCEDURE — 99223 1ST HOSP IP/OBS HIGH 75: CPT | Mod: 25

## 2019-01-01 PROCEDURE — 99291 CRITICAL CARE FIRST HOUR: CPT

## 2019-01-01 PROCEDURE — 82570 ASSAY OF URINE CREATININE: CPT

## 2019-01-01 PROCEDURE — 86901 BLOOD TYPING SEROLOGIC RH(D): CPT

## 2019-01-01 PROCEDURE — 71045 X-RAY EXAM CHEST 1 VIEW: CPT

## 2019-01-01 PROCEDURE — 84300 ASSAY OF URINE SODIUM: CPT

## 2019-01-01 PROCEDURE — 84100 ASSAY OF PHOSPHORUS: CPT

## 2019-01-01 PROCEDURE — 87521 HEPATITIS C PROBE&RVRS TRNSC: CPT

## 2019-01-01 PROCEDURE — 86900 BLOOD TYPING SEROLOGIC ABO: CPT

## 2019-01-01 PROCEDURE — 84133 ASSAY OF URINE POTASSIUM: CPT

## 2019-01-01 PROCEDURE — 82565 ASSAY OF CREATININE: CPT

## 2019-01-01 PROCEDURE — G0390: CPT

## 2019-01-01 PROCEDURE — 94003 VENT MGMT INPAT SUBQ DAY: CPT

## 2019-01-01 PROCEDURE — 82330 ASSAY OF CALCIUM: CPT

## 2019-01-01 PROCEDURE — 80048 BASIC METABOLIC PNL TOTAL CA: CPT

## 2019-01-01 PROCEDURE — 85014 HEMATOCRIT: CPT

## 2019-01-01 PROCEDURE — 99223 1ST HOSP IP/OBS HIGH 75: CPT

## 2019-01-01 PROCEDURE — 84484 ASSAY OF TROPONIN QUANT: CPT

## 2019-01-01 PROCEDURE — 82803 BLOOD GASES ANY COMBINATION: CPT

## 2019-01-01 PROCEDURE — 72170 X-RAY EXAM OF PELVIS: CPT | Mod: 26

## 2019-01-01 PROCEDURE — 86850 RBC ANTIBODY SCREEN: CPT

## 2019-01-01 PROCEDURE — 94002 VENT MGMT INPAT INIT DAY: CPT

## 2019-01-01 PROCEDURE — 72170 X-RAY EXAM OF PELVIS: CPT

## 2019-01-01 PROCEDURE — 82435 ASSAY OF BLOOD CHLORIDE: CPT

## 2019-01-01 PROCEDURE — 83930 ASSAY OF BLOOD OSMOLALITY: CPT

## 2019-01-01 PROCEDURE — 84295 ASSAY OF SERUM SODIUM: CPT

## 2019-01-01 RX ORDER — PROPOFOL 10 MG/ML
15 INJECTION, EMULSION INTRAVENOUS
Qty: 500 | Refills: 0 | Status: DISCONTINUED | OUTPATIENT
Start: 2019-01-01 | End: 2019-01-01

## 2019-01-01 RX ORDER — SODIUM CHLORIDE 9 MG/ML
1000 INJECTION INTRAMUSCULAR; INTRAVENOUS; SUBCUTANEOUS
Qty: 0 | Refills: 0 | Status: DISCONTINUED | OUTPATIENT
Start: 2019-01-01 | End: 2019-01-01

## 2019-01-01 RX ORDER — NICARDIPINE HYDROCHLORIDE 30 MG/1
5 CAPSULE, EXTENDED RELEASE ORAL
Qty: 40 | Refills: 0 | Status: DISCONTINUED | OUTPATIENT
Start: 2019-01-01 | End: 2019-01-01

## 2019-01-01 RX ORDER — ACETAMINOPHEN 500 MG
1000 TABLET ORAL ONCE
Qty: 0 | Refills: 0 | Status: COMPLETED | OUTPATIENT
Start: 2019-01-01 | End: 2019-01-01

## 2019-01-01 RX ORDER — DESMOPRESSIN ACETATE 0.1 MG/1
2 TABLET ORAL ONCE
Qty: 0 | Refills: 0 | Status: COMPLETED | OUTPATIENT
Start: 2019-01-01 | End: 2019-01-01

## 2019-01-01 RX ORDER — LEVETIRACETAM 250 MG/1
1000 TABLET, FILM COATED ORAL ONCE
Qty: 0 | Refills: 0 | Status: COMPLETED | OUTPATIENT
Start: 2019-01-01 | End: 2019-01-01

## 2019-01-01 RX ORDER — EPINEPHRINE 0.3 MG/.3ML
5 INJECTION INTRAMUSCULAR; SUBCUTANEOUS
Qty: 8 | Refills: 0 | Status: DISCONTINUED | OUTPATIENT
Start: 2019-01-01 | End: 2019-01-01

## 2019-01-01 RX ORDER — SODIUM CHLORIDE 9 MG/ML
1000 INJECTION, SOLUTION INTRAVENOUS
Qty: 0 | Refills: 0 | Status: DISCONTINUED | OUTPATIENT
Start: 2019-01-01 | End: 2019-01-01

## 2019-01-01 RX ORDER — METOPROLOL TARTRATE 50 MG
5 TABLET ORAL ONCE
Qty: 0 | Refills: 0 | Status: COMPLETED | OUTPATIENT
Start: 2019-01-01 | End: 2019-01-01

## 2019-01-01 RX ORDER — HYDROMORPHONE HYDROCHLORIDE 2 MG/ML
0.5 INJECTION INTRAMUSCULAR; INTRAVENOUS; SUBCUTANEOUS
Qty: 0 | Refills: 0 | Status: DISCONTINUED | OUTPATIENT
Start: 2019-01-01 | End: 2019-01-01

## 2019-01-01 RX ORDER — PHENYLEPHRINE HYDROCHLORIDE 10 MG/ML
0.15 INJECTION INTRAVENOUS
Qty: 40 | Refills: 0 | Status: DISCONTINUED | OUTPATIENT
Start: 2019-01-01 | End: 2019-01-01

## 2019-01-01 RX ORDER — SODIUM CHLORIDE 9 MG/ML
1000 INJECTION, SOLUTION INTRAVENOUS ONCE
Qty: 0 | Refills: 0 | Status: COMPLETED | OUTPATIENT
Start: 2019-01-01 | End: 2019-01-01

## 2019-01-01 RX ORDER — PANTOPRAZOLE SODIUM 20 MG/1
40 TABLET, DELAYED RELEASE ORAL DAILY
Qty: 0 | Refills: 0 | Status: DISCONTINUED | OUTPATIENT
Start: 2019-01-01 | End: 2019-01-01

## 2019-01-01 RX ORDER — POTASSIUM CHLORIDE 20 MEQ
10 PACKET (EA) ORAL
Qty: 0 | Refills: 0 | Status: COMPLETED | OUTPATIENT
Start: 2019-01-01 | End: 2019-01-01

## 2019-01-01 RX ORDER — CHLORHEXIDINE GLUCONATE 213 G/1000ML
1 SOLUTION TOPICAL
Qty: 0 | Refills: 0 | Status: DISCONTINUED | OUTPATIENT
Start: 2019-01-01 | End: 2019-01-01

## 2019-01-01 RX ORDER — SODIUM CHLORIDE 9 MG/ML
1000 INJECTION INTRAMUSCULAR; INTRAVENOUS; SUBCUTANEOUS ONCE
Qty: 0 | Refills: 0 | Status: DISCONTINUED | OUTPATIENT
Start: 2019-01-01 | End: 2019-01-01

## 2019-01-01 RX ORDER — LABETALOL HCL 100 MG
10 TABLET ORAL ONCE
Qty: 0 | Refills: 0 | Status: DISCONTINUED | OUTPATIENT
Start: 2019-01-01 | End: 2019-01-01

## 2019-01-01 RX ORDER — LEVETIRACETAM 250 MG/1
1000 TABLET, FILM COATED ORAL EVERY 12 HOURS
Qty: 0 | Refills: 0 | Status: DISCONTINUED | OUTPATIENT
Start: 2019-01-01 | End: 2019-01-01

## 2019-01-01 RX ORDER — CHLORHEXIDINE GLUCONATE 213 G/1000ML
15 SOLUTION TOPICAL
Qty: 0 | Refills: 0 | Status: DISCONTINUED | OUTPATIENT
Start: 2019-01-01 | End: 2019-01-01

## 2019-01-01 RX ADMIN — LEVETIRACETAM 400 MILLIGRAM(S): 250 TABLET, FILM COATED ORAL at 17:43

## 2019-01-01 RX ADMIN — CHLORHEXIDINE GLUCONATE 15 MILLILITER(S): 213 SOLUTION TOPICAL at 22:45

## 2019-01-01 RX ADMIN — SODIUM CHLORIDE 125 MILLILITER(S): 9 INJECTION, SOLUTION INTRAVENOUS at 11:15

## 2019-01-01 RX ADMIN — SODIUM CHLORIDE 150 MILLILITER(S): 9 INJECTION, SOLUTION INTRAVENOUS at 22:45

## 2019-01-01 RX ADMIN — CHLORHEXIDINE GLUCONATE 1 APPLICATION(S): 213 SOLUTION TOPICAL at 05:52

## 2019-01-01 RX ADMIN — NICARDIPINE HYDROCHLORIDE 25 MG/HR: 30 CAPSULE, EXTENDED RELEASE ORAL at 05:30

## 2019-01-01 RX ADMIN — Medication 100 MILLIEQUIVALENT(S): at 10:33

## 2019-01-01 RX ADMIN — SODIUM CHLORIDE 75 MILLILITER(S): 9 INJECTION INTRAMUSCULAR; INTRAVENOUS; SUBCUTANEOUS at 05:51

## 2019-01-01 RX ADMIN — Medication 5 MILLIGRAM(S): at 09:18

## 2019-01-01 RX ADMIN — PHENYLEPHRINE HYDROCHLORIDE 3.61 MICROGRAM(S)/KG/MIN: 10 INJECTION INTRAVENOUS at 22:48

## 2019-01-01 RX ADMIN — Medication 400 MILLIGRAM(S): at 05:30

## 2019-01-01 RX ADMIN — HYDROMORPHONE HYDROCHLORIDE 0.5 MILLIGRAM(S): 2 INJECTION INTRAMUSCULAR; INTRAVENOUS; SUBCUTANEOUS at 11:27

## 2019-01-01 RX ADMIN — PHENYLEPHRINE HYDROCHLORIDE 3.61 MICROGRAM(S)/KG/MIN: 10 INJECTION INTRAVENOUS at 15:13

## 2019-01-01 RX ADMIN — CHLORHEXIDINE GLUCONATE 15 MILLILITER(S): 213 SOLUTION TOPICAL at 13:37

## 2019-01-01 RX ADMIN — Medication 400 MILLIGRAM(S): at 12:14

## 2019-01-01 RX ADMIN — CHLORHEXIDINE GLUCONATE 15 MILLILITER(S): 213 SOLUTION TOPICAL at 13:18

## 2019-01-01 RX ADMIN — CHLORHEXIDINE GLUCONATE 15 MILLILITER(S): 213 SOLUTION TOPICAL at 05:43

## 2019-01-01 RX ADMIN — PROPOFOL 5.77 MICROGRAM(S)/KG/MIN: 10 INJECTION, EMULSION INTRAVENOUS at 05:50

## 2019-01-01 RX ADMIN — DESMOPRESSIN ACETATE 2 MICROGRAM(S): 0.1 TABLET ORAL at 11:36

## 2019-01-01 RX ADMIN — PANTOPRAZOLE SODIUM 40 MILLIGRAM(S): 20 TABLET, DELAYED RELEASE ORAL at 10:33

## 2019-01-01 RX ADMIN — PROPOFOL 5.77 MICROGRAM(S)/KG/MIN: 10 INJECTION, EMULSION INTRAVENOUS at 07:15

## 2019-01-01 RX ADMIN — LEVETIRACETAM 400 MILLIGRAM(S): 250 TABLET, FILM COATED ORAL at 13:25

## 2019-01-01 RX ADMIN — SODIUM CHLORIDE 4000 MILLILITER(S): 9 INJECTION, SOLUTION INTRAVENOUS at 14:30

## 2019-01-01 RX ADMIN — CHLORHEXIDINE GLUCONATE 1 APPLICATION(S): 213 SOLUTION TOPICAL at 05:44

## 2019-01-01 RX ADMIN — Medication 1000 MILLIGRAM(S): at 12:15

## 2019-01-01 RX ADMIN — SODIUM CHLORIDE 75 MILLILITER(S): 9 INJECTION, SOLUTION INTRAVENOUS at 10:10

## 2019-01-01 RX ADMIN — HYDROMORPHONE HYDROCHLORIDE 0.5 MILLIGRAM(S): 2 INJECTION INTRAMUSCULAR; INTRAVENOUS; SUBCUTANEOUS at 11:12

## 2019-01-01 RX ADMIN — Medication 100 MILLIEQUIVALENT(S): at 11:36

## 2019-01-01 RX ADMIN — Medication 100 MILLIEQUIVALENT(S): at 12:18

## 2019-01-01 RX ADMIN — SODIUM CHLORIDE 4000 MILLILITER(S): 9 INJECTION, SOLUTION INTRAVENOUS at 16:44

## 2019-01-01 RX ADMIN — Medication 1000 MILLIGRAM(S): at 05:55

## 2019-01-01 RX ADMIN — LEVETIRACETAM 400 MILLIGRAM(S): 250 TABLET, FILM COATED ORAL at 05:43

## 2019-01-01 RX ADMIN — NICARDIPINE HYDROCHLORIDE 25 MG/HR: 30 CAPSULE, EXTENDED RELEASE ORAL at 05:44

## 2019-01-01 RX ADMIN — PANTOPRAZOLE SODIUM 40 MILLIGRAM(S): 20 TABLET, DELAYED RELEASE ORAL at 13:37

## 2019-02-06 PROBLEM — F10.10 ALCOHOL ABUSE, UNCOMPLICATED: Chronic | Status: ACTIVE | Noted: 2017-11-14
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
## 2019-02-06 NOTE — ED ADULT NURSE NOTE - NSIMPLEMENTINTERV_GEN_ALL_ED
Implemented All Fall Risk Interventions:  [place] to call system. Call bell, personal items and telephone within reach. Instruct patient to call for assistance. Room bathroom lighting operational. Non-slip footwear when patient is off stretcher. Physically safe environment: no spills, clutter or unnecessary equipment. Stretcher in lowest position, wheels locked, appropriate side rails in place. Provide visual cue, wrist band, yellow gown, etc. Monitor gait and stability. Monitor for mental status changes and reorient to person, place, and time. Review medications for side effects contributing to fall risk. Reinforce activity limits and safety measures with patient and family.
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
## 2019-02-06 NOTE — ED PROVIDER NOTE - PROGRESS NOTE DETAILS
Patient evaluated by neurosx who is recommending discharge with outpatient follow up.  -Sal Hernandez PA-C Patient evaluated by neurosx who is recommending discharge with outpatient follow up.  Patient is currently AAO x 3.  Ambulating without assistance.  Tolerating PO.  Alcohol level negative.  no signs of withdrawal. Medically stable for discharge   -Sal Hernandez PA-C
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
## 2019-02-06 NOTE — ED PROVIDER NOTE - ATTENDING CONTRIBUTION TO CARE
Dr. Cruz (Attending Physician)  I performed a history and physical exam of the patient and discussed their management with the advanced care provider. I reviewed the advanced care provider's note and agree with the documented findings and plan of care. My medical decision making and objective findings are found above.

## 2019-02-06 NOTE — ED PROVIDER NOTE - PHYSICAL EXAMINATION
Gen: AAO x 3, NAD  Skin: No rashes  HEENT: NC/AT, PERRLA, EOMI, MMM  Resp: unlabored CTAB  Cardiac: rrr s1s2, no murmurs, rubs or gallops  GI: ND, +BS, Soft, NT  Ext: no pedal edema, FROM in all extremities  Neuro: no focal deficits

## 2019-02-06 NOTE — ED PROVIDER NOTE - PLAN OF CARE
1.  Stay well hydrated.  Take Tylenol 650mgs every 4- 6 hrs as needed for headache.  2.  Continue taking keppra as previously prescribed.  3.  Follow up with your PMD in 2-3 days.  Bring a copy of your results with your to your appointment      Follow up with neuro surgery upon discharge.   4.  Return to the ER for worsening headache, blurry vision, weakness or any other concerning symptoms

## 2019-02-06 NOTE — ED PROVIDER NOTE - NSFOLLOWUPCLINICS_GEN_ALL_ED_FT
[facility] Internal Medicine  General Internal Medicine  [address] [id number]  Phone: [phone]  Fax:   Follow Up Time:

## 2019-02-06 NOTE — ED PROVIDER NOTE - NSFOLLOWUPINSTRUCTIONS_ED_ALL_ED_FT
1.  Stay well hydrated.  Take Tylenol 650mgs every 4- 6 hrs as needed for headache.  2.  Continue taking keppra as previously prescribed.  3.  Follow up with your PMD in 2-3 days.  Bring a copy of your results with your to your appointment      Follow up with neuro surgery [phone] upon discharge.   4.  Return to the ER for worsening headache, blurry vision, weakness or any other concerning symptoms
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
## 2019-02-07 NOTE — CHART NOTE - NSCHARTNOTEFT_GEN_A_CORE
Late Entry:  LMSW received a call from the patient's wife expressing concern about the patient's discharge.  LMSW explained to wife the patient was medically cleared and indicated she would provide transportation home.  LMSW provided the contact information for the hospital including address.  LMSW advised wife upon arriving to the hospital to discuss her concerns with the medical team upon her arrival.

## 2019-02-17 NOTE — ED PROVIDER NOTE - CRITICAL CARE PROVIDED
documentation/interpretation of diagnostic studies/consultation with other physicians/direct patient care (not related to procedure)

## 2019-02-17 NOTE — ED ADULT NURSE NOTE - OBJECTIVE STATEMENT
Level 1 trauma activation. Tx from [facility] for ICH. See Trauma Flow sheet for documentation.

## 2019-02-17 NOTE — PROVIDER CONTACT NOTE (OTHER) - ACTION/TREATMENT ORDERED:
JENNIFER Dey aware and to patient bedside, Joshua infusion started, will continue to monitor. JENNIFER Dey aware and to patient bedside, propofol infusion stopped, will reassess BP in 15 minutes if BP does not improve then will start Joshua infusion started, will continue to monitor.

## 2019-02-17 NOTE — ED PROVIDER NOTE - PHYSICAL EXAMINATION
A - Intubated  B - bilateral breath sounds and good chest rise  C -palpable pulses in all extremities  D - GCS 5t on arrival  General: ill appearing, intubated  HEENT: Normocephalic, atraumatic, Pupils dilated to 5 mm unresponsive to light, no corneal reflex  Neck: Soft, midline trachea.  Chest: No chest wall creputis or deformity noted  Cardiac: S1, S2, RRR  Respiratory: ETT in place, Bilateral breath sounds, clear and equal bilaterally  Abdomen: Soft, non-distended, no obvious bruising   Groin: Normal appearing  Ext: palp radial b/l UE, b/l DP palp in Lower Extrem.   Neuro: flexion to pain in bilateral LE, no reponse to pain in UE; intubated

## 2019-02-17 NOTE — PROGRESS NOTE ADULT - ASSESSMENT
63M w/ PMHx as noted found down with AMS taken to OSH where patient was initially responsive then became more lethargic CT of head revealed large 2.1 cm right SDH with 1.8 cm left midline shift with compression of ventricle, patient transferred intubated for potential Neurosurgery intervention but on presentation here no brain stem reflexes and pupils dilated and unreactive, GCS 5t (only flexing lower extremity to pain ) , prognosis poor.    PLAN:   - cont sicu care   - NPO/ IVF  - GOC discussion with family      ATP  [id number]
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
## 2019-02-17 NOTE — ED PROVIDER NOTE - OBJECTIVE STATEMENT
As per documentation, 62yo M polysubstance abuse, EtOH abuse and seizure disorder with recent ICH p/w AMS found on floor of [place] bathroom, unwitnessed fall, unknown LOC. transferred from [place] with 2.1cm R sided SDH with 1.8cm midline shift, intubated & on propofol at OSH. On arrival here, no brain stem reflexes and pupils dilated and unreactive, GCS 5t (only flexing lower extremity to pain )

## 2019-02-17 NOTE — CONSULT NOTE ADULT - SUBJECTIVE AND OBJECTIVE BOX
Pager: [phone]  CHIEF COMPLAINT/ REASON FOR CONSULTATION:      HPI:  62yo M PMH IVDA, found down, brought to [facility] where CTH demonstrated aSDH. He was unresponsive, intubated for airway protection. Upon arrival GCS 5T. No brainstem reflexes.     PAST MEDICAL HISTORY   IV drug use  PAST SURGICAL HISTORY         MEDICATIONS:  Antibiotics:    Neuro:    Anticoagulation: None    Other:      SOCIAL HISTORY:   Occupation:   Marital Status:     FAMILY HISTORY:      REVIEW OF SYSTEMS:  Check here if all are normal other than Neurological []  Unable to obtain    PHYSICAL EXAMINATION:   T(C): --  HR: --  BP: --  RR: --  SpO2: --  Wt(kg): --    Exam:  Intubated, not OE, not FC  Pupils 5mm fixed, no dolls, no corneals  not overbreathing, no cough/gag  BUE flexor  BLE TF     LABS:                RADIOLOGY & ADDITIONAL STUDIES:  CTH: R aSDH, ~ at least 2cm herniation

## 2019-02-17 NOTE — ED PROVIDER NOTE - CLINICAL SUMMARY MEDICAL DECISION MAKING FREE TEXT BOX
2.1cm R sided SDH with 1.8cm midline shift, intubated & on propofol at OSH. Pupils dilated to 5 mm unresponsive to light, no corneal reflex. flexion to pain in bilateral LE, no reponse to pain in UE; intubated. non op per neurosx, will adm sicu for further care
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
## 2019-02-17 NOTE — CONSULT NOTE ADULT - SUBJECTIVE AND OBJECTIVE BOX
HISTORY OF PRESENT ILLNESS:  DINESH MONTIEL is a 63y Male with history of ETOH and substance abuse, seizure disorder who presented to the ED as a level 1 trauma, transfer from [place], who was found down in a restaurant bathroom. At the time of presentation, the patient had no brain stem reflexes, his pupils were dilated and unreactive, and GCS was 5T. Initial CT head showed a large left SDH with midline shift.     PAST MEDICAL HISTORY:   Seizure Disorder  Substance Abuse    PAST SURGICAL HISTORY:   None    FAMILY HISTORY:   Noncontributory     SOCIAL HISTORY:  ETOH and polysubstance abuse    CODE STATUS:   Full    HOME MEDICATIONS:  None    ALLERGIES: Allergy Status Unknown    VITAL SIGNS:  ICU Vital Signs Last 24 Hrs  T(C): 37 (17 Feb 2019 07:00), Max: 39.7 (17 Feb 2019 03:32)  T(F): 98.6 (17 Feb 2019 07:00), Max: 103.5 (17 Feb 2019 03:32)  HR: 57 (17 Feb 2019 07:45) (57 - 81)  BP: 155/78 (17 Feb 2019 07:45) (120/74 - 213/104)  BP(mean): 110 (17 Feb 2019 07:45) (90 - 149)  ABP: --  ABP(mean): --  RR: 20 (17 Feb 2019 07:45) (16 - 21)  SpO2: 96% (17 Feb 2019 07:45) (94% - 100%)      NEURO  Exam: GCS 5T, pupils fixed and dilated  propofol Infusion 15 MICROgram(s)/kG/Min IV Continuous <Continuous>    RESPIRATORY  Mechanical Ventilation: Mode: AC/ CMV (Assist Control/ Continuous Mandatory Ventilation), RR (machine): 20, RR (patient): 20, TV (machine): 500, FiO2: 100, PEEP: 5, ITime: 1, MAP: 10, PIP: 19  Exam: vent    CARDIOVASCULAR  Exam: normotensive, regular rate  Cardiac Rhythm: regular on monitor     GI/NUTRITION  Exam: soft, nondistended, nontender   Diet: NPO   pantoprazole  Injectable 40 milliGRAM(s) IV Push daily    GENITOURINARY/RENAL  sodium chloride 0.9%. 1000 milliLiter(s) IV Continuous <Continuous>      02-16 @ 07:01  -  02-17 @ 07:00  --------------------------------------------------------  IN:    niCARdipine Infusion: 75 mL    propofol Infusion: 63.2 mL    sodium chloride 0.9%: 225 mL    sodium chloride 0.9%.: 125 mL  Total IN: 488.2 mL    OUT:    Indwelling Catheter - Urethral: 2900 mL  Total OUT: 2900 mL    Total NET: -2411.8 mL    Weight (kg): 64.1 (02-17 @ 03:32)  02-17    144  |  109<H>  |  17  ----------------------------<  132<H>  3.7   |  22  |  0.98    Ca    9.0      17 Feb 2019 05:26    TPro  7.4  /  Alb  3.9  /  TBili  0.5  /  DBili  x   /  AST  27  /  ALT  27  /  AlkPhos  87  02-17    [x] Hayes catheter, indication: urine output monitoring in critically ill patient    HEMATOLOGIC  [ ] VTE Prophylaxis: holding given bleed                           14.4   19.6  )-----------( 319      ( 17 Feb 2019 02:55 )             41.9     PT/INR - ( 17 Feb 2019 02:55 )   PT: 11.2 sec;   INR: 0.97 ratio         PTT - ( 17 Feb 2019 02:55 )  PTT:25.5 sec  Transfusion: [ ] PRBC	[ ] Platelets	[ ] FFP	[ ] Cryoprecipitate      INFECTIOUS DISEASES    RECENT CULTURES:      ENDOCRINE    CAPILLARY BLOOD GLUCOSE      PATIENT CARE ACCESS DEVICES:  [x] Peripheral IV  [ ] Central Venous Line	[ ] R	[ ] L	[ ] IJ	[ ] Fem	[ ] SC	Placed:   [ ] Arterial Line		[ ] R	[ ] L	[ ] Fem	[ ] Rad	[ ] Ax	Placed:   [ ] PICC:					[ ] Mediport  [x] Urinary Catheter, Date Placed: 2/17  [x] Necessity of urinary, arterial, and venous catheters discussed    OTHER MEDICATIONS: chlorhexidine 4% Liquid 1 Application(s) Topical <User Schedule>      IMAGING STUDIES:  < from: CT Head No Cont (02.17.19 @ 02:58) >  Right holohemispheric subdural hematoma measuring up to 2.1 cm in width   with complete effacement of the right lateral ventricle and adjacent   sulci and 1.1 cm of leftward shift and uncal herniation. Subdural   hemorrhage is also noted along the interhemispheric fissure. Acute   intraparenchymal hemorrhage within the lonyn and midbrain. There is   dilatation of the left lateral ventricle with periventricular white   matter hypodensity consistent with hydrocephalus and transependymal flow   of CSF.    Partially imaged endotracheal tube.    The visualized paranasal sinuses are clear. The mastoid air cells and   middle ear cavities are clear. Partially visualized endotracheal tube.    The soft tissues of the scalp are unremarkable. The calvarium is intact.    IMPRESSION:     Right holohemispheric subdural hematoma measuring up to 2.1 cm in width   with complete effacement of the right lateral ventricle and adjacent   sulci and 1.1 cm of leftward shift and uncal herniation. Acute   intraparenchymal hemorrhage within the lonny and midbrain. There is   dilatation of the left lateral ventricle with periventricular white   matter hypodensity consistent with hydrocephalus and transependymal flow   of CSF.    < end of copied text >
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
## 2019-02-17 NOTE — PROVIDER CONTACT NOTE (OTHER) - ASSESSMENT
BP 75/50, , O2 96%, RR 20, patient remains on full ventilator support, patient remains unresponsive, no distress noted.

## 2019-02-17 NOTE — H&P ADULT - NSHPPHYSICALEXAM_GEN_ALL_CORE
General: NAD  HEENT: Normocephalic, atraumatic, Pupils dilated to 5 mm unresponsive to light  Neck: Soft, midline trachea.  Chest: No chest wall creputis or deformity noted  Cardiac: S1, S2, RRR  Respiratory: ETT in place, Bilateral breath sounds, clear and equal bilaterally  Abdomen: Soft, non-distended, no obvious bruising   Groin: Normal appearing  Ext: palp radial b/l UE, b/l DP palp in Lower Extrem.

## 2019-02-17 NOTE — H&P ADULT - HISTORY OF PRESENT ILLNESS
62yo M with unknown TRAUMA ACTIVATION LEVEL: 1    MECHANISM OF INJURY:      [x] Blunt  	[] MVC	[x] Fall	[] Pedestrian Struck	[] Motorcycle accident      [] Penetrating  	[] Gun Shot Wound 		[] Stab Wound    GCS:5 	E: 1	V: 1	M: 3    Patient is a 63y old  Male who presents with a chief complaint of SDH (17 Feb 2019 02:53)      HPI:   As per documentation, 63 M with polysubstance abuse, EtOH abuse and seizure disorder with recent ICH p/w AMS found on floor of [place]'s bathroom, unwitnessed fall, unknown LOC. Pt was lethargic on presentation to outside hospital.  Transferred for possible neurosurgery intervention. On arrival here, no brain stem reflexes and pupils dilated and unreactive, GCS 5t (only flexing lower extremity to pain ) and missing outside hospital imaging so repeat CTH performed redemonstrating large left SHD.     Primary Survey:    A - Intubated  B - bilateral breath sounds and good chest rise  C - initial BP  202/103 , HR HR: 71 (02-17-19 @ 03:06) *** , palpable pulses in all extremities  D - GCS 5t on arrival  Exposure obtained      Secondary Survey:   General: NAD  HEENT: Normocephalic, atraumatic, Pupils dilatedto 5 mm unresponsive to light  Neck: Soft, midline trachea.  Chest: No chest wall creputis or deformity noted  Cardiac: S1, S2, RRR  Respiratory: ETT in place, Bilateral breath sounds, clear and equal bilaterally  Abdomen: Soft, non-distended, no obvious bruising   Groin: Normal appearing  Ext: palp radial b/l UE, b/l DP palp in Lower Extrem.

## 2019-02-17 NOTE — H&P ADULT - ASSESSMENT
ASSESSMENT: Patient is a 63M w/ PMHx as noted found down with AMS taken to OSH where patient was initially responsive then became more lethargic CT of head revealed large 2.1 cm right SDH with 1.8 cm left midline shift with compression of ventricle, patient transferred intubated for potential Neurosurgery intervention but on presentation here no brain stem reflexes and pupils dilated and unreactive, GCS 5t (only flexing lower extremity to pain ) , prognosis grim    PLAN:    - Admit to Dr. Zavala  - f/u official read of repeat CTH performed this  morning   - NPO/ IVF  - Hypertensive on presentation, control BP  - GOC discussion with family  - Vent  - Seizure prophylaxis  - Transfer to SICU for ventilator management    MARY Lo PGY-2  ACS [id number]

## 2019-02-17 NOTE — PROGRESS NOTE ADULT - SUBJECTIVE AND OBJECTIVE BOX
Trauma Surgery Progress Note    SUBJECTIVE:  The patient was seen and examined. Intubated    OBJECTIVE:     ** VITAL SIGNS / I&O's **    Vital Signs Last 24 Hrs  T(C): 37.9 (17 Feb 2019 13:00), Max: 39.7 (17 Feb 2019 03:32)  T(F): 100.2 (17 Feb 2019 13:00), Max: 103.5 (17 Feb 2019 03:32)  HR: 109 (17 Feb 2019 14:00) (57 - 109)  BP: 74/50 (17 Feb 2019 14:00) (74/50 - 213/104)  BP(mean): 57 (17 Feb 2019 14:00) (57 - 149)  RR: 20 (17 Feb 2019 14:05) (16 - 25)  SpO2: 97% (17 Feb 2019 14:05) (93% - 100%)  Mode: AC/ CMV (Assist Control/ Continuous Mandatory Ventilation)  RR (machine): 20  TV (machine): 500  FiO2: 40  PEEP: 5  ITime: 1  MAP: 8  PIP: 17      16 Feb 2019 07:01  -  17 Feb 2019 07:00  --------------------------------------------------------  IN:    niCARdipine Infusion: 75 mL    propofol Infusion: 63.2 mL    sodium chloride 0.9%: 125 mL    sodium chloride 0.9%: 225 mL  Total IN: 488.2 mL    OUT:    Indwelling Catheter - Urethral: 2900 mL  Total OUT: 2900 mL    Total NET: -2411.8 mL      17 Feb 2019 07:01  -  17 Feb 2019 14:57  --------------------------------------------------------  IN:    dextrose 5%.: 500 mL    IV PiggyBack: 300 mL    propofol Infusion: 46.2 mL    sodium chloride 0.9%: 375 mL    Solution: 100 mL  Total IN: 1321.2 mL    OUT:    Indwelling Catheter - Urethral: 1685 mL  Total OUT: 1685 mL    Total NET: -363.8 mL          ** PHYSICAL EXAM **    -- CONSTITUTIONAL: intubated  -- PULMONARY: intubated  -- ABDOMEN: soft, non-distended      ** LABS **                          14.4   19.6  )-----------( 319      ( 17 Feb 2019 02:55 )             41.9     17 Feb 2019 09:52    155    |  117    |  16     ----------------------------<  161    3.8     |  24     |  1.00     Ca    9.6        17 Feb 2019 09:52  Phos  3.9       17 Feb 2019 09:52  Mg     2.4       17 Feb 2019 09:52    TPro  7.4    /  Alb  3.9    /  TBili  0.5    /  DBili  x      /  AST  27     /  ALT  27     /  AlkPhos  87     17 Feb 2019 05:26    PT/INR - ( 17 Feb 2019 02:55 )   PT: 11.2 sec;   INR: 0.97 ratio         PTT - ( 17 Feb 2019 02:55 )  PTT:25.5 sec  CAPILLARY BLOOD GLUCOSE            LIVER FUNCTIONS - ( 17 Feb 2019 05:26 )  Alb: 3.9 g/dL / Pro: 7.4 g/dL / ALK PHOS: 87 U/L / ALT: 27 U/L / AST: 27 U/L / GGT: x                 MEDICATIONS  (STANDING):  chlorhexidine 0.12% Liquid 15 milliLiter(s) Oral Mucosa <User Schedule>  chlorhexidine 4% Liquid 1 Application(s) Topical <User Schedule>  dextrose 5%. 1000 milliLiter(s) (125 mL/Hr) IV Continuous <Continuous>  lactated ringers Bolus 1000 milliLiter(s) IV Bolus once  levETIRAcetam  IVPB 1000 milliGRAM(s) IV Intermittent every 12 hours  pantoprazole  Injectable 40 milliGRAM(s) IV Push daily  propofol Infusion 15 MICROgram(s)/kG/Min (5.769 mL/Hr) IV Continuous <Continuous>    MEDICATIONS  (PRN):  HYDROmorphone  Injectable 0.5 milliGRAM(s) IV Push every 3 hours PRN breakthrough pain

## 2019-02-17 NOTE — ED PROVIDER NOTE - NS ED ROS FT
Review of Systems:  	•	CONSTITUTIONAL: found down    	•	RESPIRATORY: intubated  	•	  	•	NEUROLOGIC: ams

## 2019-02-17 NOTE — CONSULT NOTE ADULT - ASSESSMENT
63M found down with large right acute SDH.   - Discussed at length with wife, this is not a survivable injury, he does not have any purposeful actions, and no brainstem reflexes on exam   - No further neurosurgical intervention  - Goals of Care conversation and palliative input

## 2019-02-17 NOTE — ED PROVIDER NOTE - ATTENDING CONTRIBUTION TO CARE
Patient transferred from [facility].  Per transferring physician history of EtOH and polysubstance abuse, well known to [facility] staff, found altered [place] earlier this evening.  Workup at [facility] ultimately found SDH with large shift.  Unknown history of trauma.  Intubated and on propofol prior to transfer.    Unable to obtain ROS secondary to intubation and presentation.    Exam:  General: Patient ill appearing, intubated, GCS 5T, hypertensive otherwise vital signs within normal limits  HENT: ETT in place no noted facial trauma  Eye: pupils fixed and dialated  Cardiac: RRR S1/S2 with strong peripheral pulses  Respiratory: ventilator breath sounds, no noted chest wall trauma  GI: abdomen soft, no noted trauma  Neuro: limited exam due to presentation, pupils fixed and dialated, flexion to pain in bilateral LE, no reponse to pain in UE  Skin: warm, well perfused    Level I trauma activation.    Significant head bleed with midline shift, very poor neurologic exam, very poor prognosis, labs repeated and CT head obtained.  Neurosurgical attending at bedside, given degree of bleed and exam findings, no neurosurgical intervention as likely futile at this time, will admit SICU for further care.

## 2019-02-18 NOTE — CONSULT NOTE ADULT - ASSESSMENT
63M h/o substance abuse, found unresponsive with large SDH, now declared with brain death on two independent exams. Wife requested urology consult for sperm retrieval.    Patient has no active will to indicate his desire for post-mortem sperm retrieval.    Per [facility] post-mortem sperm retrieval policy, without living will, patient's wife must obtain a court order in order to proceed. (copy provided in chart, available on [facility] intranet)    If patient's wife wishes to pursue further, follow up with social work regarding obtaining court order.    Court order MUST state following 3 things:  --Permission for urology provider (Dr. Walsh) to retrieve sperm post-mortem from patient  --Permission for cryopreservation of sperm after harvest  --Naming of the patient's wife as trustee of patient's harvest sperm.    Also of note, discussed with wife her advanced maternal age, need for cryopreservation of sperm, need for IVF in future to achieve pregnancy with harvested sperm, and that majority of services are not covered by insurance and would be out of pocket costs. She verbalized understanding to all.    Patient may call office of Dr. Walsh for any inquiries on cost of cryopreservation services. [phone]    Sperm retrieval would need to be in operative setting. Can be done up to 24 hours of post-cardiac death.     --d/w Dr. Roper and Dr. Walsh

## 2019-02-18 NOTE — CONSULT NOTE ADULT - ASSESSMENT
63 M with polysubstance abuse, EtOH abuse and seizure disorder with recent ICH p/w AMS found on floor of [place]'s bathroom, unwitnessed fall, unknown LOC. Pt was lethargic on presentation to [facility]. CT head w/ large acute R SDH. Transferred to [facility] for possible neurosurgery intervention; upon initial eval, further intervention seemed futile. On arrival to SICU, primary care team did brain death exam, apnea testing failed. Neurology called as patient's family requesting additional evaluation.     Recs: 63 M with polysubstance abuse, EtOH abuse and seizure disorder with recent ICH p/w AMS found on floor of [place] bathroom, unwitnessed fall, unknown LOC. Pt was lethargic on presentation to [facility]. CT head w/ large acute R SDH. Transferred to [facility] for possible neurosurgery intervention; upon initial eval, further intervention seemed futile. On arrival to SICU, primary care team did brain death exam, apnea testing failed. Neurology called as patient's family requesting additional evaluation.

## 2019-02-18 NOTE — PROGRESS NOTE ADULT - SUBJECTIVE AND OBJECTIVE BOX
SICU attending  Patient met the brain death criteria on 2019 at 07: 42 am.  I explained to the girlfriend that he is technically  by brain death criteria.  ACLS performed during initial bradycardia, likely due to continued herniation, and ROSC attained.  There was an individual who stated was his son who called and I told him his father was critical and he needed to come to the hospital immediately as he was very critical.  The female bedside stated was his wife but we later learned she was not legally  to him.  The patient became bradycardic again and given 1 mg epinephrine until the son comes and will start epi drip until family comes.

## 2019-02-18 NOTE — CONSULT NOTE ADULT - SUBJECTIVE AND OBJECTIVE BOX
Urology PA Consult Note    HPI:  This is a 63 year old male with a PMHx of polysubstance abuse, ETOH abuse, seizure disorder on keppra, who was transferred from an outside hospital for possible operative intervention.  Pt was found on the floor with no witnesses and was taken to the OSH, where he was found to have a large right sided SDH with midline shift.  Urology has been consulted for the purpose of sperm retrieval in the patient, who was declared brain-dead this morning. Per wife, she and patient have been  since 2016 and have been attempting to conceive.  The last attempt was 2 weeks ago.  The wife is 47 years old, and the patient is 63 years old.  They each have 4 children with other partners, and one miscarriage between them.  The patient currently does not have a living will, and the issue of organ donation has not been decided by the wife yet.    PAST MEDICAL & SURGICAL HISTORY:  Seizure disorder      FAMILY HISTORY:  unknown at this time    SOCIAL HISTORY:   +ETOH abuse  +polysubstance abuse    chlorhexidine 0.12% Liquid 15 milliLiter(s) Oral Mucosa <User Schedule>  chlorhexidine 4% Liquid 1 Application(s) Topical <User Schedule>  dextrose 5%. 1000 milliLiter(s) IV Continuous <Continuous>  HYDROmorphone  Injectable 0.5 milliGRAM(s) IV Push every 3 hours PRN  levETIRAcetam  IVPB 1000 milliGRAM(s) IV Intermittent every 12 hours  niCARdipine Infusion 5 mG/Hr IV Continuous <Continuous>  pantoprazole  Injectable 40 milliGRAM(s) IV Push daily  phenylephrine    Infusion 0.15 MICROgram(s)/kG/Min IV Continuous <Continuous>      Allergies    Allergy Status Unknown      REVIEW OF SYSTEMS: Pertinent positives and negatives as stated in HPI, otherwise negative    Vital signs  T(C): 37.3 (02-17-19 @ 23:00), Max: 38 (02-17-19 @ 15:00)  HR: 122 (02-18-19 @ 12:28)  BP: 173/113 (02-18-19 @ 11:30)  SpO2: 96% (02-18-19 @ 12:28)  Wt(kg): --    I&O's Summary    17 Feb 2019 07:01  -  18 Feb 2019 07:00  --------------------------------------------------------  IN: 6084.4 mL / OUT: 2730 mL / NET: 3354.4 mL    18 Feb 2019 07:01  -  18 Feb 2019 14:12  --------------------------------------------------------  IN: 12.5 mL / OUT: 450 mL / NET: -437.5 mL        Physical Exam  Gen: nonverbal, +ET Tube  Abd: Soft, ND        LABS:                            14.9   15.5  )-----------( 257      ( 18 Feb 2019 03:29 )             43.5     02-18    143  |  107  |  18  ----------------------------<  141<H>  3.6   |  23  |  0.85    Ca    9.0      18 Feb 2019 09:54  Phos  3.6     02-18  Mg     2.2     02-18    TPro  7.4  /  Alb  3.9  /  TBili  0.5  /  DBili  x   /  AST  27  /  ALT  27  /  AlkPhos  87  02-17    PT/INR - ( 18 Feb 2019 03:29 )   PT: 14.6 sec;   INR: 1.27 ratio         PTT - ( 18 Feb 2019 03:29 )  PTT:30.5 sec

## 2019-02-18 NOTE — CONSULT NOTE ADULT - ATTENDING COMMENTS
Dr. Cruz (Attending Physician)  N - SDH with large midline shift, and uncal herniation, Pupils 5 mm and fixed will perform brain death exam today  P - acute resp failure eval for breathing over vent  C - htn will allow in setting of trauma  GI - on ppi    - urine awaiting urine osm will give ddavp, likely has DI will start D5W  H - holding dvt ppx  ID - no abx  E - glucose control for 120-180  Goals of Care - dw wife, awaiting family members, calling , explained very poor prognosis
Pt seen and examined, Pt not operative candidate fro surgical intervention per neurosurgery .
Patient seen and examined. Found with large SDH, no cranial nerve reflexes at time of arrival here. Apnea test was done, with no respiratory response. We repeated the brain death exam, with no evidence of brainstem function. I discussed this with his wife, that in my exam (like the previous one) he meets criteria for brain death.

## 2019-02-18 NOTE — DISCHARGE NOTE FOR THE EXPIRED PATIENT - HOSPITAL COURSE
. Patient presented with a severe traumatic brain injury of a right SDH. On admission there were no pupillary reflexes. He progressed to brain death. This was discussed with his girlfriend who was bedside. The patient went into cardiopulmonary arrest and .

## 2019-02-18 NOTE — PROGRESS NOTE ADULT - SUBJECTIVE AND OBJECTIVE BOX
Dr. Cruz (Attending Physician)  Brain death exam performed.  pupils 5 mm and nonreactive to light  no corneal reflexes  no response to painful stimuli Dr. Cruz (Attending Physician)  Brain death exam performed over the course of the night. Time of brain death 7:42 am today 2/18  Pupils 5 mm and nonreactive to light, no corneal reflexes, no lower extremity response to painful stimuli, no oculocephalic reflex, no eye deviation with irrigation of ear bilaterally with cold saline.  Apnea test performed with no visible respirations his PaCO2 increased from 39 to 72.    I discussed my findings of brain death with the patients wife. Dr. Cruz (Attending Physician)  Brain death exam performed. Time of brain death 7:42 am today 2/18  Pupils 5 mm and nonreactive to light, no corneal reflexes, no lower extremity response to painful stimuli, no oculocephalic reflex, no eye deviation with irrigation of ear bilaterally with cold saline.  Apnea test performed with no visible respirations his PaCO2 increased from 39 to 72.    I discussed my findings of brain death with the patients wife.

## 2019-02-18 NOTE — CONSULT NOTE ADULT - SUBJECTIVE AND OBJECTIVE BOX
Neurology Consult    Reason for consult:    HPI:  TRAUMA ACTIVATION LEVEL: 1    MECHANISM OF INJURY:      [x] Blunt  	[] MVC	[x] Fall	[] Pedestrian Struck	[] Motorcycle accident      [] Penetrating  	[] Gun Shot Wound 		[] Stab Wound    GCS:5 	E: 1	V: 1	M: 3    Patient is a 63y old  Male who presents with a chief complaint of SDH (17 Feb 2019 02:53)      HPI:   As per documentation, 63 M with polysubstance abuse, EtOH abuse and seizure disorder with recent ICH p/w AMS found on floor of [place]'s bathroom, unwitnessed fall, unknown LOC. Pt was lethargic on presentation to outside hospital.  Transferred for possible neurosurgery intervention. On arrival here, no brain stem reflexes and pupils dilated and unreactive, GCS 5t (only flexing lower extremity to pain ) and missing outside hospital imaging so repeat CTH performed redemonstrating large left SHD.     Primary Survey:    A - Intubated  B - bilateral breath sounds and good chest rise  C - initial BP  202/103 , HR HR: 71 (02-17-19 @ 03:06) *** , palpable pulses in all extremities  D - GCS 5t on arrival  Exposure obtained      Secondary Survey:   General: NAD  HEENT: Normocephalic, atraumatic, Pupils dilatedto 5 mm unresponsive to light  Neck: Soft, midline trachea.  Chest: No chest wall creputis or deformity noted  Cardiac: S1, S2, RRR  Respiratory: ETT in place, Bilateral breath sounds, clear and equal bilaterally  Abdomen: Soft, non-distended, no obvious bruising   Groin: Normal appearing  Ext: palp radial b/l UE, b/l DP palp in Lower Extrem. (17 Feb 2019 02:53)    63y Male     REVIEW OF SYSTEMS:  Constitutional: No fever, chills, fatigue, weakness.  Eyes: No eye pain, visual disturbances, or discharge.  ENT:  No difficulty hearing, tinnitus, vertigo. No sinus or throat pain.  Neck: No pain or stiffness.  Respiratory: No cough, dyspnea, wheezing.  Cardiovascular: No chest pain, palpitations.  Gastrointestinal: No abdominal pain. No nausea, vomiting, diarrhea, or constipation.   Genitourinary: No dysuria, frequency, hematuria or incontinence.  Neurological: No headaches, lightheadedness, vertigo, numbness or tremors.  Psychiatric: No depression, anxiety, mood swings, or difficulty sleeping.  Musculoskeletal: No joint pain or swelling. No muscle, back, or extremity pain.  Skin: No itching, burning, rashes or lesions.   Lymph Nodes: No enlarged glands  Endocrine: No heat or cold intolerance, no hair loss.  Allergy and Immunologic: No hives or eczema.    MEDICATIONS  chlorhexidine 0.12% Liquid 15 milliLiter(s) Oral Mucosa <User Schedule>  chlorhexidine 4% Liquid 1 Application(s) Topical <User Schedule>  dextrose 5%. 1000 milliLiter(s) IV Continuous <Continuous>  HYDROmorphone  Injectable 0.5 milliGRAM(s) IV Push every 3 hours PRN  levETIRAcetam  IVPB 1000 milliGRAM(s) IV Intermittent every 12 hours  niCARdipine Infusion 5 mG/Hr IV Continuous <Continuous>  pantoprazole  Injectable 40 milliGRAM(s) IV Push daily  phenylephrine    Infusion 0.15 MICROgram(s)/kG/Min IV Continuous <Continuous>      PMH: No pertinent past medical history       PSH: No significant past surgical history      FAMILY HISTORY:    No history of dementia, strokes, or seizures     SOCIAL HISTORY:  No history of tobacco or alcohol use     Allergies    Allergy Status Unknown    Intolerances        Vital Signs Last 24 Hrs  T(C): 37.3 (17 Feb 2019 23:00), Max: 38 (17 Feb 2019 15:00)  T(F): 99.1 (17 Feb 2019 23:00), Max: 100.4 (17 Feb 2019 15:00)  HR: 122 (18 Feb 2019 12:28) (64 - 132)  BP: 173/113 (18 Feb 2019 11:30) (60/41 - 263/151)  BP(mean): 137 (18 Feb 2019 11:30) (46 - 198)  RR: 16 (18 Feb 2019 11:45) (14 - 20)  SpO2: 96% (18 Feb 2019 12:28) (93% - 100%)    Neurological Examination:    Mental Status: Patient is alert, awake, oriented X3. Patient is fluent, no dysarthria, no aphasia. Follows commands well and able to answer questions appropriately. Mood and affect normal.    Cranial Nerves: PERRL, EOMI, visual field intact, V1-V3 intact, no gross facial asymmetry, tongue/uvula midline    Motor Exam: No drift  Right upper extremity: 5/5  Left upper extremity: 5/5  Right lower extremity: 5/5  Left lower extremity: 5/5    Normal bulk/tone    Sensory: Intact to light touch and pinprick bilaterally. No extinction    Coordination: Finger to nose intact bilaterally     Gait: Normal      Reflexes: Bilateral 2+ Biceps, Brachial, Patellar, Ankle  Plantar: Down bilateral    GENERAL: No acute distress  HEENT:  Normocephalic, atraumatic  EXTREMITIES: No edema, clubbing, cyanosis  MUSCULOSKELETAL: Normal range of motion  SKIN: No rashes    LABS:  CBC Full  -  ( 18 Feb 2019 03:29 )  WBC Count : 15.5 K/uL  Hemoglobin : 14.9 g/dL  Hematocrit : 43.5 %  Platelet Count - Automated : 257 K/uL  Mean Cell Volume : 96.0 fl  Mean Cell Hemoglobin : 32.8 pg  Mean Cell Hemoglobin Concentration : 34.1 gm/dL  Auto Neutrophil # : x  Auto Lymphocyte # : x  Auto Monocyte # : x  Auto Eosinophil # : x  Auto Basophil # : x  Auto Neutrophil % : x  Auto Lymphocyte % : x  Auto Monocyte % : x  Auto Eosinophil % : x  Auto Basophil % : x      02-18    143  |  107  |  18  ----------------------------<  141<H>  3.6   |  23  |  0.85    Ca    9.0      18 Feb 2019 09:54  Phos  3.6     02-18  Mg     2.2     02-18    TPro  7.4  /  Alb  3.9  /  TBili  0.5  /  DBili  x   /  AST  27  /  ALT  27  /  AlkPhos  87  02-17    LIVER FUNCTIONS - ( 17 Feb 2019 05:26 )  Alb: 3.9 g/dL / Pro: 7.4 g/dL / ALK PHOS: 87 U/L / ALT: 27 U/L / AST: 27 U/L / GGT: x           Hemoglobin A1C:       PT/INR - ( 18 Feb 2019 03:29 )   PT: 14.6 sec;   INR: 1.27 ratio         PTT - ( 18 Feb 2019 03:29 )  PTT:30.5 sec    RADIOLOGY:  CT head:  MRI: Neurology     HPI:   63 M with polysubstance abuse, EtOH abuse and seizure disorder with recent ICH p/w AMS found on floor of [place] bathroom, unwitnessed fall, unknown LOC. Pt was lethargic on presentation to [facility]. CT head w/ large acute R SDH. Transferred to [facility] for possible neurosurgery intervention; upon initial eval, further intervention seemed futile. On arrival to SICU, primary care team did brain death exam, apnea testing failed. Neurology called as patient's family requesting additional evaluation.     REVIEW OF SYSTEMS:  Unable to assess    MEDICATIONS  chlorhexidine 0.12% Liquid 15 milliLiter(s) Oral Mucosa <User Schedule>  chlorhexidine 4% Liquid 1 Application(s) Topical <User Schedule>  dextrose 5%. 1000 milliLiter(s) IV Continuous <Continuous>  HYDROmorphone  Injectable 0.5 milliGRAM(s) IV Push every 3 hours PRN  levETIRAcetam  IVPB 1000 milliGRAM(s) IV Intermittent every 12 hours  niCARdipine Infusion 5 mG/Hr IV Continuous <Continuous>  pantoprazole  Injectable 40 milliGRAM(s) IV Push daily  phenylephrine    Infusion 0.15 MICROgram(s)/kG/Min IV Continuous <Continuous>    PMH: No pertinent past medical history    PSH: No significant past surgical history    FAMILY HISTORY:  No history of dementia, strokes, or seizures     SOCIAL HISTORY:  No history of tobacco or alcohol use     Allergies  Allergy Status Unknown    Intolerances    Vital Signs Last 24 Hrs  T(C): 37.3 (17 Feb 2019 23:00), Max: 38 (17 Feb 2019 15:00)  T(F): 99.1 (17 Feb 2019 23:00), Max: 100.4 (17 Feb 2019 15:00)  HR: 122 (18 Feb 2019 12:28) (64 - 132)  BP: 173/113 (18 Feb 2019 11:30) (60/41 - 263/151)  BP(mean): 137 (18 Feb 2019 11:30) (46 - 198)  RR: 16 (18 Feb 2019 11:45) (14 - 20)  SpO2: 96% (18 Feb 2019 12:28) (93% - 100%)    Neurological Examination:    Mental Status: AOx0, not responsive to stimuli    Cranial Nerves: Pupils fixed and nonreactive, oculocephalics negative, no corneals, no facial grimace to noxious stimuli, no gag    Motor Exam: No drift  Not withdrawing x4    Normal bulk/tone    Sensory: No facial grimace to noxious stimuli    LABS:  CBC Full  -  ( 18 Feb 2019 03:29 )  WBC Count : 15.5 K/uL  Hemoglobin : 14.9 g/dL  Hematocrit : 43.5 %  Platelet Count - Automated : 257 K/uL  Mean Cell Volume : 96.0 fl  Mean Cell Hemoglobin : 32.8 pg  Mean Cell Hemoglobin Concentration : 34.1 gm/dL  Auto Neutrophil # : x  Auto Lymphocyte # : x  Auto Monocyte # : x  Auto Eosinophil # : x  Auto Basophil # : x  Auto Neutrophil % : x  Auto Lymphocyte % : x  Auto Monocyte % : x  Auto Eosinophil % : x  Auto Basophil % : x      02-18    143  |  107  |  18  ----------------------------<  141<H>  3.6   |  23  |  0.85    Ca    9.0      18 Feb 2019 09:54  Phos  3.6     02-18  Mg     2.2     02-18    TPro  7.4  /  Alb  3.9  /  TBili  0.5  /  DBili  x   /  AST  27  /  ALT  27  /  AlkPhos  87  02-17    LIVER FUNCTIONS - ( 17 Feb 2019 05:26 )  Alb: 3.9 g/dL / Pro: 7.4 g/dL / ALK PHOS: 87 U/L / ALT: 27 U/L / AST: 27 U/L / GGT: x           Hemoglobin A1C:       PT/INR - ( 18 Feb 2019 03:29 )   PT: 14.6 sec;   INR: 1.27 ratio         PTT - ( 18 Feb 2019 03:29 )  PTT:30.5 sec    RADIOLOGY:  CT head:  MRI: Neurology     HPI:   63 M with polysubstance abuse, EtOH abuse and seizure disorder with recent ICH p/w AMS found on floor of [place]'s bathroom, unwitnessed fall, unknown LOC. Pt was lethargic on presentation to [facility]. CT head w/ large acute R SDH. Transferred to [facility] for possible neurosurgery intervention; upon initial eval, further intervention seemed futile. On arrival to SICU, primary care team did brain death exam, apnea testing failed. Neurology called as patient's family requesting additional evaluation.     REVIEW OF SYSTEMS:  Unable to assess    MEDICATIONS  chlorhexidine 0.12% Liquid 15 milliLiter(s) Oral Mucosa <User Schedule>  chlorhexidine 4% Liquid 1 Application(s) Topical <User Schedule>  dextrose 5%. 1000 milliLiter(s) IV Continuous <Continuous>  HYDROmorphone  Injectable 0.5 milliGRAM(s) IV Push every 3 hours PRN  levETIRAcetam  IVPB 1000 milliGRAM(s) IV Intermittent every 12 hours  niCARdipine Infusion 5 mG/Hr IV Continuous <Continuous>  pantoprazole  Injectable 40 milliGRAM(s) IV Push daily  phenylephrine    Infusion 0.15 MICROgram(s)/kG/Min IV Continuous <Continuous>    PMH: No pertinent past medical history    PSH: No significant past surgical history    FAMILY HISTORY:  No history of dementia, strokes, or seizures     SOCIAL HISTORY:  No history of tobacco or alcohol use     Allergies  Allergy Status Unknown    Intolerances    Vital Signs Last 24 Hrs  T(C): 37.3 (17 Feb 2019 23:00), Max: 38 (17 Feb 2019 15:00)  T(F): 99.1 (17 Feb 2019 23:00), Max: 100.4 (17 Feb 2019 15:00)  HR: 122 (18 Feb 2019 12:28) (64 - 132)  BP: 173/113 (18 Feb 2019 11:30) (60/41 - 263/151)  BP(mean): 137 (18 Feb 2019 11:30) (46 - 198)  RR: 16 (18 Feb 2019 11:45) (14 - 20)  SpO2: 96% (18 Feb 2019 12:28) (93% - 100%)    Neurological Examination:    Mental Status: AOx0, not responsive to stimuli    Cranial Nerves: Pupils fixed and nonreactive, oculocephalics negative, no corneals, no facial grimace to noxious stimuli, no gag, no overbreathing vent. Cold calorics: 40cc ice water infused into each ear (with 6 minutes in between) - no eye movements observed.  Not withdrawing to painful stimuli in any extremity  Normal bulk/tone  Sensory: No facial grimace to noxious stimuli    LABS:  CBC Full  -  ( 18 Feb 2019 03:29 )  WBC Count : 15.5 K/uL  Hemoglobin : 14.9 g/dL  Hematocrit : 43.5 %  Platelet Count - Automated : 257 K/uL  Mean Cell Volume : 96.0 fl  Mean Cell Hemoglobin : 32.8 pg  Mean Cell Hemoglobin Concentration : 34.1 gm/dL  Auto Neutrophil # : x  Auto Lymphocyte # : x  Auto Monocyte # : x  Auto Eosinophil # : x  Auto Basophil # : x  Auto Neutrophil % : x  Auto Lymphocyte % : x  Auto Monocyte % : x  Auto Eosinophil % : x  Auto Basophil % : x      02-18    143  |  107  |  18  ----------------------------<  141<H>  3.6   |  23  |  0.85    Ca    9.0      18 Feb 2019 09:54  Phos  3.6     02-18  Mg     2.2     02-18    TPro  7.4  /  Alb  3.9  /  TBili  0.5  /  DBili  x   /  AST  27  /  ALT  27  /  AlkPhos  87  02-17    LIVER FUNCTIONS - ( 17 Feb 2019 05:26 )  Alb: 3.9 g/dL / Pro: 7.4 g/dL / ALK PHOS: 87 U/L / ALT: 27 U/L / AST: 27 U/L / GGT: x           Hemoglobin A1C:       PT/INR - ( 18 Feb 2019 03:29 )   PT: 14.6 sec;   INR: 1.27 ratio         PTT - ( 18 Feb 2019 03:29 )  PTT:30.5 sec    RADIOLOGY:  CT head:  MRI:

## 2019-02-18 NOTE — PROGRESS NOTE ADULT - SUBJECTIVE AND OBJECTIVE BOX
SICU Attending    Patient went into PEA arrest. Brain death confirmed at 7:42 am.  Unable to palpate a pulse and could not obtain a blood pressure.  Mechanical ventilation discontinued.   Awaiting family to come to say their good byes.

## 2019-02-18 NOTE — PROGRESS NOTE ADULT - ATTENDING COMMENTS
Patient seen and examined and agree with above.  Patient declared brain dead at 7:42. My exam was consistent with the exam performed by Dr. Cruz.  The female bedside claimed to be his wife and was not accepting of the diagnosis and asked for another opinion. Neurology was called and the neurology attending also agreed that the patient met brain death criteria.  The patient's girlfriend also requested sperm extraction and urology was called.  Patient remained critically ill with a devastating irreversible brain injury. He has no brain stem reflexes and met all the components of brain death criteria.  I discussed extensively this with the girlfriend which originally was thought to be the wife.

## 2019-02-18 NOTE — PROGRESS NOTE ADULT - SUBJECTIVE AND OBJECTIVE BOX
HISTORY  DINESH MONTIEL is a 63y Male with history of ETOH and substance abuse, seizure disorder who presented to the ED as a level 1 trauma, transfer from [place], who was found down in a restaurant bathroom. At the time of presentation, the patient had no brain stem reflexes, his pupils were dilated and unreactive, and GCS was 5T. Initial CT head showed a large left SDH with midline shift.     24 HOUR EVENTS:  - Patient initially demonstrated posturing, now does not demonstrate response to pain in UE, ?triple flexion in LE  - Brain death exam performed, patient demonstrates fixed dilated pupils, absent oculocephalic reflex, absent oculovestibular reflex, absent corneal reflex, no gag response or cough response. Apnea test consistent with brain death  - Diagnosed with diabetes insipidus with 4.1L free water deficit calculated  - DDAVP given, D5W @ 150 started with improvement in urine output but persistently elevated serum sodium  - Keppra initiated  - Patient alternating hypertensive episode requiring cardene gtt, became hypotensive requiring ibrahima  - Continued goals of care discussion with wife    SUBJECTIVE/ROS:  [ ] A ten-point review of systems was otherwise negative except as noted.  [x] Due to altered mental status/intubation, subjective information were not able to be obtained from the patient. History was obtained, to the extent possible, from review of the chart and collateral sources of information.    NEURO  RASS: -4  Exam: unresponsive to painful stimuli UE, LE w/ triple flexion  Meds: HYDROmorphone  Injectable 0.5 milliGRAM(s) IV Push every 3 hours PRN breakthrough pain  levETIRAcetam  IVPB 1000 milliGRAM(s) IV Intermittent every 12 hours    [x] Adequacy of sedation and pain control has been assessed and adjusted    RESPIRATORY  RR: 16 (02-18-19 @ 00:45) (14 - 25)  SpO2: 96% (02-18-19 @ 00:45) (93% - 100%)  Wt(kg): --  Exam: mechanical breathing, does not breathe over the ventilator  Mechanical Ventilation: Mode: AC/ CMV (Assist Control/ Continuous Mandatory Ventilation), RR (machine): 16, RR (patient): 16, TV (machine): 500, FiO2: 100, PEEP: 5, ITime: 1, MAP: 8, PIP: 16  ABG - ( 18 Feb 2019 00:36 )  pH: 7.24  /  pCO2: 72    /  pO2: 341   / HCO3: 30    / Base Excess: .3    /  SaO2: 99      Lactate: x      Meds:     CARDIOVASCULAR  HR: 96 (02-18-19 @ 00:45) (57 - 112)  BP: 178/94 (02-18-19 @ 00:45) (60/41 - 213/104)  BP(mean): 128 (02-18-19 @ 00:45) (46 - 149)  ABP: --  ABP(mean): --  Wt(kg): --  CVP(cm H2O): --    Exam: hypertensive to 190 SBP  Cardiac Rhythm: rrr  Meds: phenylephrine    Infusion 0.15 MICROgram(s)/kG/Min IV Continuous <Continuous>    GI/NUTRITION  Exam: soft, nt, nd  Diet: NPO + OGT in place  Meds: pantoprazole  Injectable 40 milliGRAM(s) IV Push daily    GENITOURINARY  I&O's Detail    02-16 @ 07:01  -  02-17 @ 07:00  --------------------------------------------------------  IN:    niCARdipine Infusion: 75 mL    propofol Infusion: 63.2 mL    sodium chloride 0.9%: 125 mL    sodium chloride 0.9%: 225 mL  Total IN: 488.2 mL    OUT:    Indwelling Catheter - Urethral: 2900 mL  Total OUT: 2900 mL    Total NET: -2411.8 mL    02-17 @ 07:01  -  02-18 @ 00:55  --------------------------------------------------------  IN:    dextrose 5%.: 1625 mL    dextrose 5%.: 300 mL    IV PiggyBack: 400 mL    Lactated Ringers IV Bolus: 2000 mL    phenylephrine   Infusion: 195.9 mL    propofol Infusion: 46.2 mL    sodium chloride 0.9%: 375 mL    Solution: 100 mL  Total IN: 5042.1 mL    OUT:    Indwelling Catheter - Urethral: 2290 mL  Total OUT: 2290 mL    Total NET: 2752.1 mL    Weight (kg): 64.1 (02-17 @ 03:32)  02-17    150<H>  |  116<H>  |  18  ----------------------------<  157<H>  3.5   |  21<L>  |  0.87    Ca    8.9      17 Feb 2019 23:44  Phos  3.9     02-17  Mg     2.4     02-17    TPro  7.4  /  Alb  3.9  /  TBili  0.5  /  DBili  x   /  AST  27  /  ALT  27  /  AlkPhos  87  02-17    [x] Hayes catheter, indication: critical monitoring  Meds: dextrose 5%. 1000 milliLiter(s) IV Continuous <Continuous>      HEMATOLOGIC  Meds:   [x] VTE Prophylaxis                        14.4   19.6  )-----------( 319      ( 17 Feb 2019 02:55 )             41.9     PT/INR - ( 17 Feb 2019 02:55 )   PT: 11.2 sec;   INR: 0.97 ratio         PTT - ( 17 Feb 2019 02:55 )  PTT:25.5 sec  Transfusion     [ ] PRBC   [ ] Platelets   [ ] FFP   [ ] Cryoprecipitate    INFECTIOUS DISEASES  T(C): 37.3 (02-17-19 @ 23:00), Max: 39.7 (02-17-19 @ 03:32)  Wt(kg): --  WBC Count: 19.6 K/uL (02-17 @ 02:55)    Recent Cultures:    Meds:     ENDOCRINE  Capillary Blood Glucose    Meds:     ACCESS DEVICES:  [x] Peripheral IV  [ ] Central Venous Line	[ ] R	[ ] L	[ ] IJ	[ ] Fem	[ ] SC	Placed:   [ ] Arterial Line		[ ] R	[ ] L	[ ] Fem	[ ] Rad	[ ] Ax	Placed:   [ ] PICC:					[ ] Mediport  [x] Urinary Catheter, Date Placed:   [ ] Necessity of urinary, arterial, and venous catheters discussed    OTHER MEDICATIONS:  chlorhexidine 0.12% Liquid 15 milliLiter(s) Oral Mucosa <User Schedule>  chlorhexidine 4% Liquid 1 Application(s) Topical <User Schedule>    CODE STATUS: Full HISTORY  DINESH MONTIEL is a 63y Male with history of ETOH and substance abuse, seizure disorder who presented to the ED as a level 1 trauma, transfer from [place], who was found down in a restaurant bathroom. At the time of presentation, the patient had no brain stem reflexes, his pupils were dilated and unreactive, and GCS was 5T. Initial CT head showed a large left SDH with midline shift.     24 HOUR EVENTS:  - Patient initially demonstrated posturing, now does not demonstrate response to pain in UE, ?triple flexion in LE  - Brain death exam performed, patient demonstrates fixed dilated pupils, absent oculocephalic reflex, absent oculovestibular reflex, absent corneal reflex, no gag response or cough response. Apnea test consistent with brain death  - Diagnosed with diabetes insipidus with 4.1L free water deficit calculated  - DDAVP given, D5W @ 150 started with improvement in urine output but persistently elevated serum sodium  - Keppra initiated  - Patient alternating hypertensive episode requiring cardene gtt, became hypotensive requiring ibrahima  - Continued goals of care discussion with family    SUBJECTIVE/ROS:  [ ] A ten-point review of systems was otherwise negative except as noted.  [x] Due to altered mental status/intubation, subjective information were not able to be obtained from the patient. History was obtained, to the extent possible, from review of the chart and collateral sources of information.    NEURO  RASS: -4  Exam: unresponsive to painful stimuli UE, LE w/ triple flexion  Meds: HYDROmorphone  Injectable 0.5 milliGRAM(s) IV Push every 3 hours PRN breakthrough pain  levETIRAcetam  IVPB 1000 milliGRAM(s) IV Intermittent every 12 hours    [x] Adequacy of sedation and pain control has been assessed and adjusted    RESPIRATORY  RR: 16 (02-18-19 @ 00:45) (14 - 25)  SpO2: 96% (02-18-19 @ 00:45) (93% - 100%)  Wt(kg): --  Exam: mechanical breathing, does not breathe over the ventilator  Mechanical Ventilation: Mode: AC/ CMV (Assist Control/ Continuous Mandatory Ventilation), RR (machine): 16, RR (patient): 16, TV (machine): 500, FiO2: 100, PEEP: 5, ITime: 1, MAP: 8, PIP: 16  ABG - ( 18 Feb 2019 00:36 )  pH: 7.24  /  pCO2: 72    /  pO2: 341   / HCO3: 30    / Base Excess: .3    /  SaO2: 99      Lactate: x      Meds:     CARDIOVASCULAR  HR: 96 (02-18-19 @ 00:45) (57 - 112)  BP: 178/94 (02-18-19 @ 00:45) (60/41 - 213/104)  BP(mean): 128 (02-18-19 @ 00:45) (46 - 149)  ABP: --  ABP(mean): --  Wt(kg): --  CVP(cm H2O): --    Exam: hypertensive to 190 SBP  Cardiac Rhythm: rrr  Meds: phenylephrine    Infusion 0.15 MICROgram(s)/kG/Min IV Continuous <Continuous>    GI/NUTRITION  Exam: soft, nt, nd  Diet: NPO + OGT in place  Meds: pantoprazole  Injectable 40 milliGRAM(s) IV Push daily    GENITOURINARY  I&O's Detail    02-16 @ 07:01  -  02-17 @ 07:00  --------------------------------------------------------  IN:    niCARdipine Infusion: 75 mL    propofol Infusion: 63.2 mL    sodium chloride 0.9%: 125 mL    sodium chloride 0.9%: 225 mL  Total IN: 488.2 mL    OUT:    Indwelling Catheter - Urethral: 2900 mL  Total OUT: 2900 mL    Total NET: -2411.8 mL    02-17 @ 07:01  -  02-18 @ 00:55  --------------------------------------------------------  IN:    dextrose 5%.: 1625 mL    dextrose 5%.: 300 mL    IV PiggyBack: 400 mL    Lactated Ringers IV Bolus: 2000 mL    phenylephrine   Infusion: 195.9 mL    propofol Infusion: 46.2 mL    sodium chloride 0.9%: 375 mL    Solution: 100 mL  Total IN: 5042.1 mL    OUT:    Indwelling Catheter - Urethral: 2290 mL  Total OUT: 2290 mL    Total NET: 2752.1 mL    Weight (kg): 64.1 (02-17 @ 03:32)  02-17    150<H>  |  116<H>  |  18  ----------------------------<  157<H>  3.5   |  21<L>  |  0.87    Ca    8.9      17 Feb 2019 23:44  Phos  3.9     02-17  Mg     2.4     02-17    TPro  7.4  /  Alb  3.9  /  TBili  0.5  /  DBili  x   /  AST  27  /  ALT  27  /  AlkPhos  87  02-17    [x] Hayes catheter, indication: critical monitoring  Meds: dextrose 5%. 1000 milliLiter(s) IV Continuous <Continuous>      HEMATOLOGIC  Meds:   [x] VTE Prophylaxis                        14.4   19.6  )-----------( 319      ( 17 Feb 2019 02:55 )             41.9     PT/INR - ( 17 Feb 2019 02:55 )   PT: 11.2 sec;   INR: 0.97 ratio         PTT - ( 17 Feb 2019 02:55 )  PTT:25.5 sec  Transfusion     [ ] PRBC   [ ] Platelets   [ ] FFP   [ ] Cryoprecipitate    INFECTIOUS DISEASES  T(C): 37.3 (02-17-19 @ 23:00), Max: 39.7 (02-17-19 @ 03:32)  Wt(kg): --  WBC Count: 19.6 K/uL (02-17 @ 02:55)    Recent Cultures:    Meds:     ENDOCRINE  Capillary Blood Glucose    Meds:     ACCESS DEVICES:  [x] Peripheral IV  [ ] Central Venous Line	[ ] R	[ ] L	[ ] IJ	[ ] Fem	[ ] SC	Placed:   [ ] Arterial Line		[ ] R	[ ] L	[ ] Fem	[ ] Rad	[ ] Ax	Placed:   [ ] PICC:					[ ] Mediport  [x] Urinary Catheter, Date Placed:   [ ] Necessity of urinary, arterial, and venous catheters discussed    OTHER MEDICATIONS:  chlorhexidine 0.12% Liquid 15 milliLiter(s) Oral Mucosa <User Schedule>  chlorhexidine 4% Liquid 1 Application(s) Topical <User Schedule>    CODE STATUS: Full

## 2019-02-18 NOTE — PROGRESS NOTE ADULT - ASSESSMENT
DINESH MONTIEL is a 63y Male with history of ETOH and substance abuse, seizure disorder who presented to the ED as a level 1 trauma, transfer from [place], who was found down in a restaurant bathroom, found to have a right holohemispheric SDH with leftward shift and uncal herniation. Patient does not demonstrate brainstem reflexes, concerning for brain death.    PLAN:    NEURO: SDH w/ large midline shift and herniation  - Propofol discontinued  - Keppra 1000 BID   - Coma neuro checks q1  - Will discuss neuro status and outlook further with family    RESPIRATORY:   - PRVC @ 500/16/5/40  - Patient does not overbreath the ventilator  - Apnea test meets brain death criteria    CARDIOVASCULAR:  - Unstable vitals (alternating HTN, hypotension)   - Joshua gtt  - No longer requiring cardene gtt    GI/NUTRITION:   - NPO + OGT  - No acute concerns    GENITOURINARY/RENAL: diabetes insipidus  - D5W @ 150  - DDAVP for DI   - Continue to monitor BMP for hypernatremia  - Hayes in place    HEMATOLOGIC:  - Holding DVT PPX given bleed    INFECTIOUS DISEASE:  - Febrile to 38 on 2/17  - Will culture if febrile again    ENDOCRINE:  - No acute concerns     Dispo: palliative care to see on 2/18, continued goals of care discussion with family    Charlotte Nuñez PGY-2  SICU [id number]

## 2019-02-25 NOTE — ED ADULT NURSE NOTE - TEMPLATE LIST FOR HEAD TO TOE ASSESSMENT
Clinical:  Right mid abdominal pain.

 

Technique:  Supine and upright views of the abdomen and pelvis.

 

Comparison:  Chest x-ray dated 11/21/2018.

 

Findings:

Lung fields demonstrate diffuse chronic interstitial changes.  Superimposed

bibasilar infiltrates (right greater than left) cannot be excluded.

 

Bowel gas pattern is nonspecific.  No obstruction or perforation identified.

Skeletal structures demonstrate osteopenia and degenerative changes along with

chronic levoconvex scoliosis.

 

Impression:

Cannot exclude acute-on-chronic bilateral lower lobe infiltrates.

Nonspecific bowel gas pattern.

 

 

Electronically Signed by

Martir Newberry MD 02/25/2019 02:35 P Neuro

## 2019-03-08 NOTE — ED ADULT NURSE NOTE - NURSING NEURO ORIENTATION
Per patient, she did not request medication. Pharmacy notified that patient does not need mediation.      oriented to person, place and time

## 2019-04-26 NOTE — ED PROVIDER NOTE - PSH
Consent: The patient's consent was obtained including but not limited to risks of crusting, scabbing, blistering, scarring, darker or lighter pigmentary change, recurrence, incomplete removal and infection. Number Of Freeze-Thaw Cycles: 1 freeze-thaw cycle No significant past surgical history Render Post-Care Instructions In Note?: no Detail Level: Detailed Duration Of Freeze Thaw-Cycle (Seconds): 5 Post-Care Instructions: I reviewed with the patient in detail post-care instructions. Patient is to wear sunprotection, and avoid picking at any of the treated lesions. Pt may apply Vaseline to crusted or scabbing areas.

## 2020-05-19 NOTE — ED ADULT NURSE NOTE - RESPIRATORY ASSESSMENT
Called to follow up with patient. She states that she just received the letter that explains the tests and has no questions at this time    Zohreh Betancourt RN  [facility]     WDL

## 2021-11-18 NOTE — ED ADULT TRIAGE NOTE - CHIEF COMPLAINT QUOTE
Pt. presents to the ED picked up from [facility] for unsteady gait and right leg pain. Pt. reports having right leg surgery 3-4 weeks ago, external fixation device in place to leg. Pt. admits to drinking alcohol today and being homeless. glasses

## 2022-06-14 NOTE — ED PROVIDER NOTE - NSTIMEPROVIDERCAREINITIATE_GEN_ER
Plan:    The patient and I discussed Covid 19 viral infection.  The patient was offered antivirals as he has a history of diabetes and hypothyroid.  The patient states he is feeling better today which is day 3 of his symptomatology.  Patient declines antiviral medication and would like to just get over the present infection.    I recommend Mucinex DM as needed for cough and congestion.  The patient may use saline nasal spray to flush his nose.    Patient use Tylenol or ibuprofen for aches and fever.  The patient states he has had no fever symptoms.    Patient is recommended to remain in isolation for minimum of 5 days starting from the day of symptom onset.  He may exit isolation if he has no fever and is improving in his symptomatology after 5 days of isolation.  He is recommended to wear a mask in the house and to sequester himself from other family members he did not have Covid 19.    Once out of isolation is recommended continuing wearing a mask outdoors for an additional 5 days and to maintain social distancing.    I recommend he do a virtual video visit with me in approximately 5 to 7 days to reevaluate his symptoms.    In the event that his symptoms worsen over the next 24 hours he may call me and we can still consider paxlovid.  If he consider paxlovid he is to monitor her sugar very well.    The patient will keep his scheduled appointment for follow-up for diabetes and hypothyroid as scheduled.  (June 27, 2022)    Time spent with patient 20 minutes    
07-Mar-2017 14:45

## 2022-06-21 NOTE — ED ADULT NURSE NOTE - NS ED NURSE DC INFO COMPLEXITY
remove all shaprs and pills. and lighters
Verbalized Understanding/Simple: Patient demonstrates quick and easy understanding

## 2023-08-29 NOTE — ED PROVIDER NOTE - RESPIRATORY, MLM
Physical Therapy Visit    Visit Type: Daily Treatment Note  Visit: 2  Referring Provider: Ricardo Kimble PA-C  Medical Diagnosis (from order): Diagnosis Information    Diagnosis  723.1 (ICD-9-CM) - M54.2 (ICD-10-CM) - Cervicalgia       Patient alert and oriented X3.    SUBJECTIVE                                                                                                               Patient states that his neck is feeling a little worse today. He did not do anything different and he had a hard time sleeping.     Pain / Symptoms  - Pain rating (out of 10): Current: 7       OBJECTIVE                                                                                                                                    Outcome/Assessments  Outcome Measures:   Initial evaluation 08/14/2023:  Neck Disability Index (NDI): Neck Disability Index Score: 20  NDI Total Possible Score: 50  NDI Score Calculated: 40 %  (scored 0-100, higher score indicates higher disability) see flowsheet for additional documentation        Treatment    Ultrasound (65328)  - Location: Right cervical paraspinals and upper trapezius muscle  - Position: sitting  - Duty Cycle: 100%  - Frequency: 1 Mhz  - Intensity (w/cm2): 1.2     - Intensity: patient tolerance  - Duration: 8 minutes    Results: decreased pain  Reaction: no adverse reaction to treatment      Therapeutic Exercise  Supine chin tuck: 10 reps x 5 sec hold  Seated AROM cervical rotation: 10 reps each direction  Seated AROM cervical side bending: 10 reps each direction  Seated AROM cervical flexion and extension: 10 reps  + Seated upper trapezius muscle stretch: 3 x 30 sec hold, right   + Seated levator scapulae muscle stretch: 3 x 30 sec hold, right     Educated patient on HEP.    + added this session    Manual Therapy   Soft tissue mobilization to right upper trapezius, suboccipital and cervical paraspinal muscles in supine  Cervical distraction in supine    Skilled input: verbal  instruction/cues, tactile instruction/cues, posture correction and facilitation    Writer verbally educated and received verbal consent for hand placement, positioning of patient, and techniques to be performed today from patient for clothing adjustments for techniques, therapist position for techniques and hand placement and palpation for techniques as described above and how they are pertinent to the patient's plan of care.  Home Exercise Program  Access Code: [email or web address]  URL: [email or web address]  Date: 08/29/2023  Prepared by: Ronda Logan    Exercises  - Supine Chin Tuck  - 1 x daily - 7 x weekly - 2 sets - 10 reps - 5 sec hold  - Seated Cervical Rotation AROM  - 1 x daily - 7 x weekly - 1-3 sets - 10 reps  - Seated Cervical Sidebending AROM  - 1 x daily - 7 x weekly - 1-3 sets - 10 reps  - Seated Cervical Flexion AROM  - 1 x daily - 7 x weekly - 1-3 sets - 10 reps  - Seated Cervical Extension AROM  - 1 x daily - 7 x weekly - 1-3 sets - 10 reps  - Seated Upper Trapezius Stretch  - 1 x daily - 7 x weekly - 1 sets - 3 reps - 30 sec hold  - Gentle Levator Scapulae Stretch  - 1 x daily - 7 x weekly - 1 sets - 3 reps - 30 sec hold      ASSESSMENT                                                                                                            Patient had increased myofascial restriction and increased sensitivity with gentle soft tissue mobilization to right upper trapezius and cervical paraspinal muscles. He jumped off of the table due to the pain from increased pressure to his right cervical paraspinal muscles. Requires cueing to avoid lifting his head off of the table with supine chin tucks. Ultrasound on his right cervical neck and upper trapezius muscle to assist with pain relief. Patient reported his neck pain improved after the ultrasound.  Pain/symptoms after session (out of 10): 3  Education:   - Results of above outlined education: Verbalizes understanding and Demonstrates  understanding    PLAN                                                                                                                           Suggestions for next session as indicated: Progress per plan of care      Goals  Long Term Goals: to be met by end of plan of care  1. Patient will be able to turn his neck to the right with less than 4/10 cervical neck pain to scan his environment when driving.   2. Patient will report ability to sleep through the night with less than 3 interruptions from his cervical neck pain.  3. Patient will report ability to turn in bed with less than 4/10 cervical neck pain.  4. Patient will be independent with progressed and modified home exercise program.      Therapy procedure time and total treatment time can be found documented on the Time Entry flowsheet     Breath sounds clear and equal bilaterally.

## 2024-06-11 NOTE — ED ADULT NURSE NOTE - PSYCHOSOCIAL WDL
Reason for visit: follow up      Dx/Hx/Sx: ureterocele, congenital     Records/imaging/labs/orders: [facility] ANTHONY scheduled to be completed 6/ 24/24    At Rooming: virtual visit   
Alert and oriented x 3, normal mood and affect, no apparent risk to self or others.